# Patient Record
Sex: FEMALE | Race: WHITE | Employment: OTHER | URBAN - METROPOLITAN AREA
[De-identification: names, ages, dates, MRNs, and addresses within clinical notes are randomized per-mention and may not be internally consistent; named-entity substitution may affect disease eponyms.]

---

## 2017-02-08 ENCOUNTER — OFFICE VISIT (OUTPATIENT)
Dept: OBGYN CLINIC | Facility: CLINIC | Age: 42
End: 2017-02-08

## 2017-02-08 VITALS
HEIGHT: 67 IN | WEIGHT: 127 LBS | DIASTOLIC BLOOD PRESSURE: 67 MMHG | BODY MASS INDEX: 19.93 KG/M2 | HEART RATE: 49 BPM | SYSTOLIC BLOOD PRESSURE: 104 MMHG

## 2017-02-08 DIAGNOSIS — Z12.4 SCREENING FOR MALIGNANT NEOPLASM OF CERVIX: ICD-10-CM

## 2017-02-08 DIAGNOSIS — Z12.31 ENCOUNTER FOR SCREENING MAMMOGRAM FOR BREAST CANCER: ICD-10-CM

## 2017-02-08 DIAGNOSIS — Z01.419 ENCOUNTER FOR GYNECOLOGICAL EXAMINATION: Primary | ICD-10-CM

## 2017-02-08 PROCEDURE — 99396 PREV VISIT EST AGE 40-64: CPT | Performed by: OBSTETRICS & GYNECOLOGY

## 2017-02-09 NOTE — PROGRESS NOTES
Sam Sellers is a 38year old female [id number] Patient's last menstrual period was 01/27/2017. here for annual exam.       Last seen 12/25/15. Last pap 5/2014 normal with neg HPV. Had Paragard IUD placed in 2/2010. Menses q month x 7 days.   No ruiz EXAM:   /67 mmHg  Pulse 49  Ht 5' 7\" (1.702 m)  Wt 127 lb (57.607 kg)  BMI 19.89 kg/m2  LMP 01/27/2017  Breastfeeding?  Unknown  Wt Readings from Last 2 Encounters:  02/08/17 : 127 lb (57.607 kg)  12/28/15 : 129 lb (58.514 kg)    Body mass index is 1

## 2017-02-10 LAB — HPV I/H RISK 1 DNA SPEC QL NAA+PROBE: NEGATIVE

## 2017-03-07 ENCOUNTER — HOSPITAL ENCOUNTER (OUTPATIENT)
Dept: MAMMOGRAPHY | Facility: HOSPITAL | Age: 42
Discharge: HOME | End: 2017-03-07
Attending: OBSTETRICS & GYNECOLOGY
Payer: COMMERCIAL

## 2017-03-07 DIAGNOSIS — Z12.31 ENCOUNTER FOR SCREENING MAMMOGRAM FOR BREAST CANCER: ICD-10-CM

## 2017-03-07 PROCEDURE — 77067 SCR MAMMO BI INCL CAD: CPT

## 2017-06-15 ENCOUNTER — OFFICE VISIT (OUTPATIENT)
Dept: INTERNAL MEDICINE CLINIC | Facility: CLINIC | Age: 42
End: 2017-06-15

## 2017-06-15 VITALS
WEIGHT: 128 LBS | SYSTOLIC BLOOD PRESSURE: 95 MMHG | HEART RATE: 70 BPM | BODY MASS INDEX: 20.09 KG/M2 | OXYGEN SATURATION: 96 % | DIASTOLIC BLOOD PRESSURE: 57 MMHG | TEMPERATURE: 99 F | HEIGHT: 67 IN

## 2017-06-15 DIAGNOSIS — E04.1 THYROID NODULE: ICD-10-CM

## 2017-06-15 DIAGNOSIS — Z00.00 ADULT GENERAL MEDICAL EXAMINATION: Primary | ICD-10-CM

## 2017-06-15 PROCEDURE — 99386 PREV VISIT NEW AGE 40-64: CPT | Performed by: INTERNAL MEDICINE

## 2017-06-15 PROCEDURE — 81003 URINALYSIS AUTO W/O SCOPE: CPT | Performed by: INTERNAL MEDICINE

## 2017-06-15 NOTE — PROGRESS NOTES
HPI:    Patient ID: Nel Cain is a 38year old female. HPI  REASON FOR VISIT:    Nel Cain is a 38year old female who presents for an [address]   Nel Cain is a 38year old female who presents for a complete physical Other Topics            Concern  Caffeine Concern        Yes    Comment:coffee, chocolate, 1 cups daily          Obstetric History     T2    TAB0  SAB0  E0  M0  L2      Hx of Pap: all Paps normal              No current outpatient prescriptions Diabetes Screening  if history of high blood pressure or other  risk factors No results found for: A1C  No results found for: GLUCOSE, GLU      Gonorrhea Screening if high risk No results found for: GONOCOCCUS    HIV Screening For all adults age 22-65, old Constitutional: Negative. Negative for fever, chills, diaphoresis, activity change, appetite change, fatigue and unexpected weight change.    HENT: Negative for congestion, dental problem, drooling, ear discharge, ear pain, facial swelling, hearing loss, m BP 95/57 mmHg  Pulse 70  Temp(Src) 98.7 °F (37.1 °C) (Tympanic)  Ht 5' 7\" (1.702 m)  Wt 128 lb (58.06 kg)  BMI 20.04 kg/m2  SpO2 96%  LMP 05/20/2017 (Within Days)  Breastfeeding? No   Patient's last menstrual period was 05/20/2017 (within days).    VELIA Mouth/Throat: Uvula is midline, oropharynx is clear and moist and mucous membranes are normal. Mucous membranes are not pale, not dry and not cyanotic. She does not have dentures. No oral lesions. No trismus in the jaw.  No dental abscesses, uvula swelling Pulmonary/Chest: Effort normal and breath sounds normal. No accessory muscle usage or stridor. No apnea, no tachypnea and no bradypnea. No respiratory distress. She has no decreased breath sounds. She has no wheezes. She has no rhonchi. She has no rales.  Aníbal Lino Plantar flexion: normal   Inversion: normal   Eversion: normal     Tests   Anterior drawer: negative  Varus tilt: negative      Other   Erythema: absent  Pulse: present       Left Ankle Exam   Swelling: none    Tenderness   The patient is experiencing no t Knee pain and stiffness. Excercises. Stretch pre and post. Hydrate. Comfortable and support shoes. Hold imaging.      Orders Placed This Encounter  Lipid Panel [E]  Comp Metabolic Panel (14) [E]  TSH W Reflex To Free T4 [E]  POC Urinalysis, Auto, w/o scope

## 2017-06-15 NOTE — PATIENT INSTRUCTIONS
ASSESSMENT/PLAN:   Adult general medical examination  (primary encounter diagnosis) Check urine and blood. Thyroid nodule l side. Check U/S and blood. Knee pain and stiffness. Excercises. Stretch pre and post. Hydrate.  Comfortable and support shoes

## 2017-06-22 ENCOUNTER — NURSE ONLY (OUTPATIENT)
Dept: INTERNAL MEDICINE CLINIC | Facility: CLINIC | Age: 42
End: 2017-06-22

## 2017-06-22 DIAGNOSIS — Z00.00 ADULT GENERAL MEDICAL EXAMINATION: ICD-10-CM

## 2017-06-22 DIAGNOSIS — E04.1 THYROID NODULE: ICD-10-CM

## 2017-06-22 PROCEDURE — 36415 COLL VENOUS BLD VENIPUNCTURE: CPT | Performed by: INTERNAL MEDICINE

## 2017-06-22 PROCEDURE — 99211 OFF/OP EST MAY X REQ PHY/QHP: CPT | Performed by: INTERNAL MEDICINE

## 2017-07-03 NOTE — PROGRESS NOTES
The potassium is a little bit elevated. May be from sitting outside for too long would recommend checking a basic metabolic at lab where they can process it right away. Lots of hydration. Orders written for basic metabolic.   CMP Normal (electrolytes, toth

## 2017-07-05 ENCOUNTER — LAB ENCOUNTER (OUTPATIENT)
Dept: LAB | Facility: HOSPITAL | Age: 42
End: 2017-07-05
Attending: INTERNAL MEDICINE
Payer: COMMERCIAL

## 2017-07-05 DIAGNOSIS — E87.5 HYPERKALEMIA: ICD-10-CM

## 2017-07-05 LAB
ANION GAP SERPL CALC-SCNC: 8 MMOL/L (ref 0–18)
BUN SERPL-MCNC: 15 MG/DL (ref 8–20)
BUN/CREAT SERPL: 15.2 (ref 10–20)
CALCIUM SERPL-MCNC: 9.4 MG/DL (ref 8.5–10.5)
CHLORIDE SERPL-SCNC: 101 MMOL/L (ref 95–110)
CO2 SERPL-SCNC: 25 MMOL/L (ref 22–32)
CREAT SERPL-MCNC: 0.99 MG/DL (ref 0.5–1.5)
GLUCOSE SERPL-MCNC: 84 MG/DL (ref 70–99)
OSMOLALITY UR CALC.SUM OF ELEC: 278 MOSM/KG (ref 275–295)
POTASSIUM SERPL-SCNC: 4.6 MMOL/L (ref 3.3–5.1)
SODIUM SERPL-SCNC: 134 MMOL/L (ref 136–144)

## 2017-07-05 PROCEDURE — 36415 COLL VENOUS BLD VENIPUNCTURE: CPT

## 2017-07-05 PROCEDURE — 80048 BASIC METABOLIC PNL TOTAL CA: CPT

## 2018-01-23 ENCOUNTER — OFFICE VISIT (OUTPATIENT)
Dept: OBGYN CLINIC | Facility: CLINIC | Age: 43
End: 2018-01-23

## 2018-01-23 VITALS
DIASTOLIC BLOOD PRESSURE: 62 MMHG | SYSTOLIC BLOOD PRESSURE: 108 MMHG | HEART RATE: 52 BPM | BODY MASS INDEX: 21 KG/M2 | WEIGHT: 131.63 LBS

## 2018-01-23 DIAGNOSIS — Z12.31 ENCOUNTER FOR SCREENING MAMMOGRAM FOR BREAST CANCER: ICD-10-CM

## 2018-01-23 DIAGNOSIS — Z01.419 ENCOUNTER FOR GYNECOLOGICAL EXAMINATION: Primary | ICD-10-CM

## 2018-01-23 PROCEDURE — 99396 PREV VISIT EST AGE 40-64: CPT | Performed by: OBSTETRICS & GYNECOLOGY

## 2018-01-23 NOTE — PROGRESS NOTES
Jalen Chavarria is a 41year old female [id number] Patient's last menstrual period was 01/15/2018. here for annual exam.       Last seen 2/8/17. Last pap 2/2017 normal with neg HPV. Menses q month. Had Paragard IUD placed 2/2010.     No changes with heal denies depression or anxiety. Endocrine:   denies excessive thirst or urination. Heme/Lymph:  easy bruising or bleeding.     PHYSICAL EXAM:   /62   Pulse 52   Wt 131 lb 9.6 oz (59.7 kg)   LMP 01/15/2018   BMI 20.61 kg/m²   Wt Readings from Last 2 En

## 2018-11-05 ENCOUNTER — HOSPITAL ENCOUNTER (OUTPATIENT)
Dept: MAMMOGRAPHY | Age: 43
Discharge: HOME | End: 2018-11-05
Attending: OBSTETRICS & GYNECOLOGY
Payer: COMMERCIAL

## 2018-11-05 DIAGNOSIS — Z12.31 ENCOUNTER FOR SCREENING MAMMOGRAM FOR BREAST CANCER: ICD-10-CM

## 2018-11-05 PROCEDURE — 77063 BREAST TOMOSYNTHESIS BI: CPT | Performed by: OBSTETRICS & GYNECOLOGY

## 2018-11-05 PROCEDURE — 77067 SCR MAMMO BI INCL CAD: CPT | Performed by: OBSTETRICS & GYNECOLOGY

## 2018-11-30 ENCOUNTER — APPOINTMENT (OUTPATIENT)
Dept: GENERAL RADIOLOGY | Age: 43
End: 2018-11-30
Attending: EMERGENCY MEDICINE
Payer: COMMERCIAL

## 2018-11-30 ENCOUNTER — HOSPITAL ENCOUNTER (OUTPATIENT)
Age: 43
Discharge: HOME | End: 2018-11-30
Attending: EMERGENCY MEDICINE
Payer: COMMERCIAL

## 2018-11-30 VITALS
SYSTOLIC BLOOD PRESSURE: 115 MMHG | OXYGEN SATURATION: 99 % | HEART RATE: 66 BPM | TEMPERATURE: 99 F | DIASTOLIC BLOOD PRESSURE: 81 MMHG | RESPIRATION RATE: 18 BRPM

## 2018-11-30 DIAGNOSIS — S82.65XA CLOSED NONDISPLACED FRACTURE OF LATERAL MALLEOLUS OF LEFT FIBULA, INITIAL ENCOUNTER: Primary | ICD-10-CM

## 2018-11-30 PROCEDURE — 99214 OFFICE O/P EST MOD 30 MIN: CPT

## 2018-11-30 PROCEDURE — 99203 OFFICE O/P NEW LOW 30 MIN: CPT

## 2018-11-30 PROCEDURE — 73610 X-RAY EXAM OF ANKLE: CPT | Performed by: EMERGENCY MEDICINE

## 2018-11-30 PROCEDURE — 73630 X-RAY EXAM OF FOOT: CPT | Performed by: EMERGENCY MEDICINE

## 2018-11-30 NOTE — ED PROVIDER NOTES
Patient Seen in: [facility]    History   Patient presents with:  Lower Extremity Injury (musculoskeletal)    Stated Complaint: ankle injury    HPI    The patient is a 73-year-old female with no significant past medical h the upper and lower extremities bilaterally  Extremities: Tenderness to the lateral aspect of the left ankle and minimal tenderness to the lateral aspect of the left foot.   Skin: No ecchymosis to the left foot      ED Course   Labs Reviewed - No data to Lena Sanderson

## 2018-11-30 NOTE — PROGRESS NOTES
I have not seen this patient in greater than a year. Can see anyone. Can follow up with us. Should follow up.

## 2019-02-27 ENCOUNTER — OFFICE VISIT (OUTPATIENT)
Dept: OBGYN CLINIC | Facility: CLINIC | Age: 44
End: 2019-02-27
Payer: COMMERCIAL

## 2019-02-27 VITALS
HEIGHT: 67 IN | HEART RATE: 73 BPM | DIASTOLIC BLOOD PRESSURE: 63 MMHG | SYSTOLIC BLOOD PRESSURE: 98 MMHG | BODY MASS INDEX: 20.56 KG/M2 | WEIGHT: 131 LBS

## 2019-02-27 DIAGNOSIS — Z12.31 ENCOUNTER FOR SCREENING MAMMOGRAM FOR BREAST CANCER: ICD-10-CM

## 2019-02-27 DIAGNOSIS — Z01.419 ENCOUNTER FOR GYNECOLOGICAL EXAMINATION: Primary | ICD-10-CM

## 2019-02-27 PROCEDURE — 99396 PREV VISIT EST AGE 40-64: CPT | Performed by: OBSTETRICS & GYNECOLOGY

## 2019-02-27 NOTE — PROGRESS NOTES
Rajesh Sherman is a 45year old female [id number] Patient's last menstrual period was 02/01/2019. here for annual exam.       Last seen 1/23/18. Last pap 2/2017 normal with neg HPV. Menses q month. Had paragard IUD placed in 2/2010.     Had ankle fract headaches, extremity weakness or numbness. Psychiatric: denies depression or anxiety. Endocrine:   denies excessive thirst or urination. Heme/Lymph:  easy bruising or bleeding.     PHYSICAL EXAM:   BP 98/63   Pulse 73   Ht 5' 7\" (1.702 m)   Wt 131 lb (5

## 2019-10-15 ENCOUNTER — TELEPHONE (OUTPATIENT)
Dept: OBGYN CLINIC | Facility: CLINIC | Age: 44
End: 2019-10-15

## 2019-10-15 NOTE — TELEPHONE ENCOUNTER
Pt has annual scheduled with RHIANNA in Feb 2020 and asking if she can also have IUD removed during same visit. Pt does not want to replace IUD. Informed pt message will be routed to [facility] to ask and we will call her back.

## 2019-10-15 NOTE — TELEPHONE ENCOUNTER
Pt requesting to have IUD removed but requesting to have it be done the same day of annual appt please advise

## 2019-10-22 NOTE — TELEPHONE ENCOUNTER
Pt requesting to have 2 separate appts due to insurance. Assisted pt with scheduling appt with RHIANNA on 2/4/2020 for IUD removal. Pt very appreciative and verbalized understanding.

## 2019-11-05 ENCOUNTER — HOSPITAL ENCOUNTER (OUTPATIENT)
Dept: MAMMOGRAPHY | Age: 44
Discharge: HOME | End: 2019-11-05
Attending: OBSTETRICS & GYNECOLOGY
Payer: COMMERCIAL

## 2019-11-05 DIAGNOSIS — Z12.31 ENCOUNTER FOR SCREENING MAMMOGRAM FOR BREAST CANCER: ICD-10-CM

## 2019-11-05 PROCEDURE — 77063 BREAST TOMOSYNTHESIS BI: CPT | Performed by: OBSTETRICS & GYNECOLOGY

## 2019-11-05 PROCEDURE — 77067 SCR MAMMO BI INCL CAD: CPT | Performed by: OBSTETRICS & GYNECOLOGY

## 2019-11-07 NOTE — PROGRESS NOTES
Normal mammogram.   Breast tissue very dense. Radiology is offering whole breast ultrasound to detect any masses that might be obscured by dense breast tissue. Whole breast ultrasound may not be covered by insurance. If you want it, call office.   Primus Miguel

## 2020-02-06 ENCOUNTER — OFFICE VISIT (OUTPATIENT)
Dept: OBGYN CLINIC | Facility: CLINIC | Age: 45
End: 2020-02-06
Payer: COMMERCIAL

## 2020-02-06 VITALS
SYSTOLIC BLOOD PRESSURE: 108 MMHG | DIASTOLIC BLOOD PRESSURE: 68 MMHG | BODY MASS INDEX: 21 KG/M2 | HEART RATE: 63 BPM | WEIGHT: 131 LBS

## 2020-02-06 DIAGNOSIS — Z30.432 ENCOUNTER FOR REMOVAL OF INTRAUTERINE CONTRACEPTIVE DEVICE: Primary | ICD-10-CM

## 2020-02-06 PROCEDURE — 58301 REMOVE INTRAUTERINE DEVICE: CPT | Performed by: OBSTETRICS & GYNECOLOGY

## 2020-02-06 NOTE — PROCEDURES
IUD Removal     Consent signed. Procedure discussed with the patient in detail including indication, risks, benefits, alternatives and complications.     Pelvic Exam Findings:  Lesion description:  IUD strings seen from cervix    Procedure:  Speculum cristian

## 2020-07-30 ENCOUNTER — OFFICE VISIT (OUTPATIENT)
Dept: INTERNAL MEDICINE CLINIC | Facility: CLINIC | Age: 45
End: 2020-07-30
Payer: COMMERCIAL

## 2020-07-30 VITALS
RESPIRATION RATE: 18 BRPM | OXYGEN SATURATION: 99 % | HEART RATE: 78 BPM | SYSTOLIC BLOOD PRESSURE: 100 MMHG | HEIGHT: 67 IN | BODY MASS INDEX: 20.78 KG/M2 | DIASTOLIC BLOOD PRESSURE: 68 MMHG | WEIGHT: 132.38 LBS | TEMPERATURE: 98 F

## 2020-07-30 DIAGNOSIS — G89.29 CHRONIC PAIN OF LEFT ANKLE: ICD-10-CM

## 2020-07-30 DIAGNOSIS — Z12.4 PAP SMEAR FOR CERVICAL CANCER SCREENING: ICD-10-CM

## 2020-07-30 DIAGNOSIS — R21 RASH: ICD-10-CM

## 2020-07-30 DIAGNOSIS — Z71.85 VACCINE COUNSELING: ICD-10-CM

## 2020-07-30 DIAGNOSIS — Z00.00 ROUTINE GENERAL MEDICAL EXAMINATION AT A HEALTH CARE FACILITY: ICD-10-CM

## 2020-07-30 DIAGNOSIS — E04.1 NONTOXIC UNINODULAR GOITER: ICD-10-CM

## 2020-07-30 DIAGNOSIS — M25.572 CHRONIC PAIN OF LEFT ANKLE: ICD-10-CM

## 2020-07-30 DIAGNOSIS — E78.2 MIXED HYPERLIPIDEMIA: ICD-10-CM

## 2020-07-30 DIAGNOSIS — J45.20 MILD INTERMITTENT ASTHMA WITHOUT COMPLICATION: Primary | ICD-10-CM

## 2020-07-30 LAB
APPEARANCE: CLEAR
MULTISTIX LOT#: 1044 NUMERIC
PH, URINE: 7.5 (ref 4.5–8)
SPECIFIC GRAVITY: 1.01 (ref 1–1.03)
URINE-COLOR: YELLOW
UROBILINOGEN,SEMI-QN: 0.2 MG/DL (ref 0–1.9)

## 2020-07-30 PROCEDURE — 90732 PPSV23 VACC 2 YRS+ SUBQ/IM: CPT | Performed by: INTERNAL MEDICINE

## 2020-07-30 PROCEDURE — 3078F DIAST BP <80 MM HG: CPT | Performed by: INTERNAL MEDICINE

## 2020-07-30 PROCEDURE — 99213 OFFICE O/P EST LOW 20 MIN: CPT | Performed by: INTERNAL MEDICINE

## 2020-07-30 PROCEDURE — 81003 URINALYSIS AUTO W/O SCOPE: CPT | Performed by: INTERNAL MEDICINE

## 2020-07-30 PROCEDURE — 99386 PREV VISIT NEW AGE 40-64: CPT | Performed by: INTERNAL MEDICINE

## 2020-07-30 PROCEDURE — 90471 IMMUNIZATION ADMIN: CPT | Performed by: INTERNAL MEDICINE

## 2020-07-30 PROCEDURE — 3074F SYST BP LT 130 MM HG: CPT | Performed by: INTERNAL MEDICINE

## 2020-07-30 PROCEDURE — 3008F BODY MASS INDEX DOCD: CPT | Performed by: INTERNAL MEDICINE

## 2020-07-30 RX ORDER — CLOTRIMAZOLE AND BETAMETHASONE DIPROPIONATE 10; .64 MG/G; MG/G
1 CREAM TOPICAL 2 TIMES DAILY
Qty: 45 G | Refills: 1 | Status: SHIPPED | OUTPATIENT
Start: 2020-07-30 | End: 2021-01-20

## 2020-07-30 RX ORDER — NYSTATIN 100000 [USP'U]/G
1 POWDER TOPICAL 2 TIMES DAILY
Qty: 1 BOTTLE | Refills: 0 | Status: SHIPPED | OUTPATIENT
Start: 2020-07-30 | End: 2021-01-20

## 2020-07-30 NOTE — PROGRESS NOTES
HPI:    Patient ID: Harold Pallas is a 39year old female.     Harold Pallas is a 39year old female who presents for a complete physical exam.   HPI:   Patient presents with:  Physical: annual exam       Patient's last menstrual period was 07/15/2020 • clotrimazole-betamethasone 1-0.05 % External Cream Apply 1 Application topically 2 (two) times daily.  45 g 1      Past Medical History:   Diagnosis Date   • Ankle fracture    • Pregnancy , 2008-, FT-F--5#13; 2412-SO-T--#8   • CREATSERUM 0.99 07/05/2017 09:27 AM    CA 9.4 07/05/2017 09:27 AM    AST 21 06/22/2017 10:11 AM    ALT 12 (L) 06/22/2017 10:11 AM    TSH 1.00 06/22/2017 10:11 AM    T4F 0.87 08/24/2012 01:00 PM        Lab Results   Component Value Date/Time    CHOLEST 203 HENT: Negative for congestion, dental problem, drooling, ear discharge, ear pain, facial swelling, hearing loss, mouth sores, nosebleeds, postnasal drip, rhinorrhea, sinus pressure, sinus pain, sneezing, sore throat, tinnitus, trouble swallowing and voice • clotrimazole-betamethasone 1-0.05 % External Cream Apply 1 Application topically 2 (two) times daily.  45 g 1     Allergies:No Known Allergies    HISTORY:  Past Medical History:   Diagnosis Date   • Ankle fracture 2018   • Pregnancy 2006, 2008 2006-NSV Right Ear: Tympanic membrane, external ear and ear canal normal. No lacerations. No drainage, swelling or tenderness. No foreign bodies. No mastoid tenderness.  Tympanic membrane is not injected, not scarred, not perforated, not erythematous, not retracted Cardiovascular: Normal rate, regular rhythm, S1 normal, S2 normal, normal heart sounds, intact distal pulses and normal pulses. Pulses:       Carotid pulses are 2+ on the right side and 2+ on the left side.        Radial pulses are 2+ on the right side an Head (left side): No submental, no submandibular, no preauricular, no posterior auricular and no occipital adenopathy present. She has no cervical adenopathy.         Right cervical: No superficial cervical, no deep cervical and no posterior cervic Sig: Apply 1 Application topically 2 (two) times daily. • clotrimazole-betamethasone 1-0.05 % External Cream 45 g 1     Sig: Apply 1 Application topically 2 (two) times daily. Imaging & Referrals:  PNEUMOCOCCAL IMM (PNEUMOVAX)     RTC 1 yr.  For p

## 2020-07-30 NOTE — PATIENT INSTRUCTIONS
ASSESSMENT/PLAN:   Mild intermittent asthma without complication  (primary encounter diagnosis) Stable. Mixed hyperlipidemia Check blood. Nontoxic uninodular goiter Hold U/S. Check blood.      Routine general medical examination at a health care fac lie down. Medicine  Your healthcare provider may suggest oral nonsteroidal anti-inflammatory medicine (NSAIDs), such as ibuprofen. This relieves the pain and helps reduce swelling. Be sure to take your medicine as directed.   Exercises    After about 2 to your foot back to a straight position. Repeat this exercise 10 times. 9. Do this exercise 3 times a day, or as instructed. Cedrick last reviewed this educational content on 5/1/2016  © 7994-8964 [employer].  [address] [facility]

## 2020-09-03 ENCOUNTER — LAB ENCOUNTER (OUTPATIENT)
Dept: LAB | Facility: HOSPITAL | Age: 45
End: 2020-09-03
Attending: INTERNAL MEDICINE
Payer: COMMERCIAL

## 2020-09-03 DIAGNOSIS — Z00.00 ROUTINE GENERAL MEDICAL EXAMINATION AT A HEALTH CARE FACILITY: ICD-10-CM

## 2020-09-03 DIAGNOSIS — E78.2 MIXED HYPERLIPIDEMIA: ICD-10-CM

## 2020-09-03 DIAGNOSIS — E04.1 NONTOXIC UNINODULAR GOITER: ICD-10-CM

## 2020-09-03 LAB
ALBUMIN SERPL-MCNC: 3.9 G/DL (ref 3.4–5)
ALBUMIN/GLOB SERPL: 1.1 {RATIO} (ref 1–2)
ALP LIVER SERPL-CCNC: 51 U/L (ref 37–98)
ALT SERPL-CCNC: 16 U/L (ref 13–56)
ANION GAP SERPL CALC-SCNC: 5 MMOL/L (ref 0–18)
AST SERPL-CCNC: 14 U/L (ref 15–37)
BASOPHILS # BLD AUTO: 0.02 X10(3) UL (ref 0–0.2)
BASOPHILS NFR BLD AUTO: 0.5 %
BILIRUB SERPL-MCNC: 0.6 MG/DL (ref 0.1–2)
BUN BLD-MCNC: 15 MG/DL (ref 7–18)
BUN/CREAT SERPL: 13.9 (ref 10–20)
CALCIUM BLD-MCNC: 10 MG/DL (ref 8.5–10.1)
CHLORIDE SERPL-SCNC: 106 MMOL/L (ref 98–112)
CHOLEST SMN-MCNC: 195 MG/DL (ref ?–200)
CO2 SERPL-SCNC: 29 MMOL/L (ref 21–32)
CREAT BLD-MCNC: 1.08 MG/DL (ref 0.55–1.02)
DEPRECATED RDW RBC AUTO: 41 FL (ref 35.1–46.3)
EOSINOPHIL # BLD AUTO: 0.11 X10(3) UL (ref 0–0.7)
EOSINOPHIL NFR BLD AUTO: 2.6 %
ERYTHROCYTE [DISTWIDTH] IN BLOOD BY AUTOMATED COUNT: 11.8 % (ref 11–15)
GLOBULIN PLAS-MCNC: 3.6 G/DL (ref 2.8–4.4)
GLUCOSE BLD-MCNC: 83 MG/DL (ref 70–99)
HCT VFR BLD AUTO: 42.5 % (ref 35–48)
HDLC SERPL-MCNC: 86 MG/DL (ref 40–59)
HGB BLD-MCNC: 14.3 G/DL (ref 12–16)
IMM GRANULOCYTES # BLD AUTO: 0.01 X10(3) UL (ref 0–1)
IMM GRANULOCYTES NFR BLD: 0.2 %
LDLC SERPL CALC-MCNC: 95 MG/DL (ref ?–100)
LYMPHOCYTES # BLD AUTO: 1.4 X10(3) UL (ref 1–4)
LYMPHOCYTES NFR BLD AUTO: 33.2 %
M PROTEIN MFR SERPL ELPH: 7.5 G/DL (ref 6.4–8.2)
MCH RBC QN AUTO: 31.8 PG (ref 26–34)
MCHC RBC AUTO-ENTMCNC: 33.6 G/DL (ref 31–37)
MCV RBC AUTO: 94.7 FL (ref 80–100)
MONOCYTES # BLD AUTO: 0.34 X10(3) UL (ref 0.1–1)
MONOCYTES NFR BLD AUTO: 8.1 %
NEUTROPHILS # BLD AUTO: 2.34 X10 (3) UL (ref 1.5–7.7)
NEUTROPHILS # BLD AUTO: 2.34 X10(3) UL (ref 1.5–7.7)
NEUTROPHILS NFR BLD AUTO: 55.4 %
NONHDLC SERPL-MCNC: 109 MG/DL (ref ?–130)
OSMOLALITY SERPL CALC.SUM OF ELEC: 290 MOSM/KG (ref 275–295)
PATIENT FASTING Y/N/NP: YES
PATIENT FASTING Y/N/NP: YES
PLATELET # BLD AUTO: 126 10(3)UL (ref 150–450)
POTASSIUM SERPL-SCNC: 4.9 MMOL/L (ref 3.5–5.1)
RBC # BLD AUTO: 4.49 X10(6)UL (ref 3.8–5.3)
SODIUM SERPL-SCNC: 140 MMOL/L (ref 136–145)
TRIGL SERPL-MCNC: 72 MG/DL (ref 30–149)
TSI SER-ACNC: 1.16 MIU/ML (ref 0.36–3.74)
VLDLC SERPL CALC-MCNC: 14 MG/DL (ref 0–30)
WBC # BLD AUTO: 4.2 X10(3) UL (ref 4–11)

## 2020-09-03 PROCEDURE — 80053 COMPREHEN METABOLIC PANEL: CPT

## 2020-09-03 PROCEDURE — 36415 COLL VENOUS BLD VENIPUNCTURE: CPT

## 2020-09-03 PROCEDURE — 80061 LIPID PANEL: CPT

## 2020-09-03 PROCEDURE — 84443 ASSAY THYROID STIM HORMONE: CPT

## 2020-09-03 PROCEDURE — 85025 COMPLETE CBC W/AUTO DIFF WBC: CPT

## 2020-09-10 ENCOUNTER — LAB ENCOUNTER (OUTPATIENT)
Dept: LAB | Age: 45
End: 2020-09-10
Attending: INTERNAL MEDICINE
Payer: COMMERCIAL

## 2020-09-10 DIAGNOSIS — R79.89 PLATELET COUNT LESS 140,000 PER CUBIC MILLIMETER: ICD-10-CM

## 2020-09-10 DIAGNOSIS — R79.89 ELEVATED SERUM CREATININE: ICD-10-CM

## 2020-09-10 DIAGNOSIS — R79.89 ELEVATED PLATELET COUNT: ICD-10-CM

## 2020-09-10 DIAGNOSIS — R79.89 ELEVATED SERUM CREATININE: Primary | ICD-10-CM

## 2020-09-10 LAB
ANION GAP SERPL CALC-SCNC: 5 MMOL/L (ref 0–18)
BASOPHILS # BLD AUTO: 0.03 X10(3) UL (ref 0–0.2)
BASOPHILS NFR BLD AUTO: 0.7 %
BUN BLD-MCNC: 15 MG/DL (ref 7–18)
BUN/CREAT SERPL: 13.9 (ref 10–20)
CALCIUM BLD-MCNC: 9.9 MG/DL (ref 8.5–10.1)
CHLORIDE SERPL-SCNC: 105 MMOL/L (ref 98–112)
CO2 SERPL-SCNC: 30 MMOL/L (ref 21–32)
CREAT BLD-MCNC: 1.08 MG/DL (ref 0.55–1.02)
DEPRECATED RDW RBC AUTO: 41 FL (ref 35.1–46.3)
EOSINOPHIL # BLD AUTO: 0.11 X10(3) UL (ref 0–0.7)
EOSINOPHIL NFR BLD AUTO: 2.5 %
ERYTHROCYTE [DISTWIDTH] IN BLOOD BY AUTOMATED COUNT: 11.7 % (ref 11–15)
GLUCOSE BLD-MCNC: 85 MG/DL (ref 70–99)
HCT VFR BLD AUTO: 42.6 % (ref 35–48)
HGB BLD-MCNC: 14.2 G/DL (ref 12–16)
IMM GRANULOCYTES # BLD AUTO: 0 X10(3) UL (ref 0–1)
IMM GRANULOCYTES NFR BLD: 0 %
LYMPHOCYTES # BLD AUTO: 1.71 X10(3) UL (ref 1–4)
LYMPHOCYTES NFR BLD AUTO: 39.2 %
MCH RBC QN AUTO: 31.9 PG (ref 26–34)
MCHC RBC AUTO-ENTMCNC: 33.3 G/DL (ref 31–37)
MCV RBC AUTO: 95.7 FL (ref 80–100)
MONOCYTES # BLD AUTO: 0.41 X10(3) UL (ref 0.1–1)
MONOCYTES NFR BLD AUTO: 9.4 %
NEUTROPHILS # BLD AUTO: 2.1 X10 (3) UL (ref 1.5–7.7)
NEUTROPHILS # BLD AUTO: 2.1 X10(3) UL (ref 1.5–7.7)
NEUTROPHILS NFR BLD AUTO: 48.2 %
OSMOLALITY SERPL CALC.SUM OF ELEC: 290 MOSM/KG (ref 275–295)
PATIENT FASTING Y/N/NP: YES
PLATELET # BLD AUTO: 128 10(3)UL (ref 150–450)
POTASSIUM SERPL-SCNC: 4.8 MMOL/L (ref 3.5–5.1)
RBC # BLD AUTO: 4.45 X10(6)UL (ref 3.8–5.3)
SODIUM SERPL-SCNC: 140 MMOL/L (ref 136–145)
WBC # BLD AUTO: 4.4 X10(3) UL (ref 4–11)

## 2020-09-10 PROCEDURE — 36415 COLL VENOUS BLD VENIPUNCTURE: CPT

## 2020-09-10 PROCEDURE — 85025 COMPLETE CBC W/AUTO DIFF WBC: CPT

## 2020-09-10 PROCEDURE — 80048 BASIC METABOLIC PNL TOTAL CA: CPT

## 2020-10-13 ENCOUNTER — HOSPITAL ENCOUNTER (OUTPATIENT)
Dept: ULTRASOUND IMAGING | Facility: HOSPITAL | Age: 45
Discharge: HOME | End: 2020-10-13
Attending: NURSE PRACTITIONER
Payer: COMMERCIAL

## 2020-10-13 DIAGNOSIS — R79.89 ELEVATED SERUM CREATININE: ICD-10-CM

## 2020-10-13 PROCEDURE — 76775 US EXAM ABDO BACK WALL LIM: CPT | Performed by: NURSE PRACTITIONER

## 2020-10-13 PROCEDURE — 93975 VASCULAR STUDY: CPT | Performed by: NURSE PRACTITIONER

## 2020-11-30 ENCOUNTER — HOSPITAL ENCOUNTER (OUTPATIENT)
Age: 45
Discharge: HOME | End: 2020-11-30
Attending: EMERGENCY MEDICINE
Payer: COMMERCIAL

## 2020-11-30 ENCOUNTER — APPOINTMENT (OUTPATIENT)
Dept: GENERAL RADIOLOGY | Age: 45
End: 2020-11-30
Attending: EMERGENCY MEDICINE
Payer: COMMERCIAL

## 2020-11-30 VITALS
HEART RATE: 59 BPM | SYSTOLIC BLOOD PRESSURE: 106 MMHG | HEIGHT: 67 IN | BODY MASS INDEX: 27.47 KG/M2 | WEIGHT: 175 LBS | TEMPERATURE: 97 F | OXYGEN SATURATION: 100 % | RESPIRATION RATE: 16 BRPM | DIASTOLIC BLOOD PRESSURE: 79 MMHG

## 2020-11-30 DIAGNOSIS — M25.561 ARTHRALGIA OF RIGHT LOWER LEG: Primary | ICD-10-CM

## 2020-11-30 DIAGNOSIS — S93.401A MODERATE RIGHT ANKLE SPRAIN, INITIAL ENCOUNTER: ICD-10-CM

## 2020-11-30 PROCEDURE — 73630 X-RAY EXAM OF FOOT: CPT | Performed by: EMERGENCY MEDICINE

## 2020-11-30 PROCEDURE — 99213 OFFICE O/P EST LOW 20 MIN: CPT | Performed by: EMERGENCY MEDICINE

## 2020-11-30 PROCEDURE — 73610 X-RAY EXAM OF ANKLE: CPT | Performed by: EMERGENCY MEDICINE

## 2020-11-30 NOTE — ED INITIAL ASSESSMENT (HPI)
Pt states rolled R ankle s/p fall 2 days ago, worse after going for a walk on a rough surface on the same day.

## 2020-11-30 NOTE — ED PROVIDER NOTES
Patient Seen in: [facility] [place]    History   Patient presents with:   Ankle Pain    Stated Complaint: rt ankle inj    HPI    HPI: Mike Robles is a 39year old female who presents after an injury to the right ankle and foot with a twisting, in HPI.    Physical Exam     ED Triage Vitals [11/30/20 1123]   /79   Pulse 59   Resp 16   Temp 97.4 °F (36.3 °C)   Temp src Temporal   SpO2 100 %   O2 Device None (Room air)       Current:/79   Pulse 59   Temp 97.4 °F (36.3 °C) (Temporal)   Resp (primary encounter diagnosis)  Moderate right ankle sprain, initial encounter    Disposition:  Discharge    Follow-up:  Catalino Holley [facility]  [address] [facility] [phone]    Schedule an appointment as soon as possible for a

## 2021-01-20 ENCOUNTER — NURSE TRIAGE (OUTPATIENT)
Dept: INTERNAL MEDICINE CLINIC | Facility: CLINIC | Age: 46
End: 2021-01-20

## 2021-01-20 ENCOUNTER — LAB ENCOUNTER (OUTPATIENT)
Dept: LAB | Facility: HOSPITAL | Age: 46
End: 2021-01-20
Attending: INTERNAL MEDICINE
Payer: COMMERCIAL

## 2021-01-20 ENCOUNTER — VIRTUAL PHONE E/M (OUTPATIENT)
Dept: INTERNAL MEDICINE CLINIC | Facility: CLINIC | Age: 46
End: 2021-01-20
Payer: COMMERCIAL

## 2021-01-20 DIAGNOSIS — Z20.822 SUSPECTED COVID-19 VIRUS INFECTION: ICD-10-CM

## 2021-01-20 DIAGNOSIS — Z20.822 SUSPECTED COVID-19 VIRUS INFECTION: Primary | ICD-10-CM

## 2021-01-20 PROCEDURE — 99213 OFFICE O/P EST LOW 20 MIN: CPT | Performed by: INTERNAL MEDICINE

## 2021-01-20 NOTE — TELEPHONE ENCOUNTER
Action Requested: Summary for Provider     []  Critical Lab, Recommendations Needed  [] Need Additional Advice  [x]   FYI    []   Need Orders  [] Need Medications Sent to Pharmacy  []  Other     SUMMARY: Patient calling with complaint of headache on right

## 2021-01-20 NOTE — PROGRESS NOTES
Virtual Telephone Check-In    Matthew Zhang verbally consents to a Virtual/Telephone Check-In visit on 01/20/21. Patient has been referred to the [facility] website at [email or web address] to review the yearly Consent to Treat document.     Patient unders covid 19 -advised her to continue to monitor her symptoms, drink fluids, can take Tylenol or ibuprofen as needed for headache, fever chills or body aches, will order COVID-19 test.  If the headache gets worse if it is associate with any dizziness, blurry v

## 2021-01-21 LAB — SARS-COV-2 RNA RESP QL NAA+PROBE: NOT DETECTED

## 2021-03-18 ENCOUNTER — IMMUNIZATION (OUTPATIENT)
Dept: LAB | Facility: HOSPITAL | Age: 46
End: 2021-03-18
Attending: HOSPITALIST
Payer: COMMERCIAL

## 2021-03-18 DIAGNOSIS — Z23 NEED FOR VACCINATION: Primary | ICD-10-CM

## 2021-03-18 PROCEDURE — 0011A SARSCOV2 VAC 100MCG/0.5ML IM: CPT

## 2021-04-15 ENCOUNTER — IMMUNIZATION (OUTPATIENT)
Dept: LAB | Facility: HOSPITAL | Age: 46
End: 2021-04-15
Attending: EMERGENCY MEDICINE
Payer: COMMERCIAL

## 2021-04-15 DIAGNOSIS — Z23 NEED FOR VACCINATION: Primary | ICD-10-CM

## 2021-04-15 PROCEDURE — 0012A SARSCOV2 VAC 100MCG/0.5ML IM: CPT

## 2021-04-20 ENCOUNTER — OFFICE VISIT (OUTPATIENT)
Dept: OBGYN CLINIC | Facility: CLINIC | Age: 46
End: 2021-04-20
Payer: COMMERCIAL

## 2021-04-20 VITALS
BODY MASS INDEX: 20.75 KG/M2 | HEART RATE: 60 BPM | WEIGHT: 132.19 LBS | DIASTOLIC BLOOD PRESSURE: 66 MMHG | SYSTOLIC BLOOD PRESSURE: 100 MMHG | HEIGHT: 67 IN

## 2021-04-20 DIAGNOSIS — Z12.31 ENCOUNTER FOR SCREENING MAMMOGRAM FOR BREAST CANCER: ICD-10-CM

## 2021-04-20 DIAGNOSIS — Z01.419 ENCOUNTER FOR GYNECOLOGICAL EXAMINATION: Primary | ICD-10-CM

## 2021-04-20 DIAGNOSIS — Z12.4 SCREENING FOR MALIGNANT NEOPLASM OF CERVIX: ICD-10-CM

## 2021-04-20 PROBLEM — Z71.85 VACCINE COUNSELING: Status: RESOLVED | Noted: 2020-07-30 | Resolved: 2021-04-20

## 2021-04-20 PROCEDURE — 3074F SYST BP LT 130 MM HG: CPT | Performed by: OBSTETRICS & GYNECOLOGY

## 2021-04-20 PROCEDURE — 3078F DIAST BP <80 MM HG: CPT | Performed by: OBSTETRICS & GYNECOLOGY

## 2021-04-20 PROCEDURE — 3008F BODY MASS INDEX DOCD: CPT | Performed by: OBSTETRICS & GYNECOLOGY

## 2021-04-20 PROCEDURE — 99396 PREV VISIT EST AGE 40-64: CPT | Performed by: OBSTETRICS & GYNECOLOGY

## 2021-04-20 NOTE — PROGRESS NOTES
Jalen Chavarria is a 42year old female [id number] Patient's last menstrual period was 04/05/2021. here for annual exam.       Last seen 2/6/2020. Had Paragard IUD removed 2/2020. Menses lighter. Menses q month. LMP 4/5/21.  got vasectomy.     L headaches, extremity weakness or numbness. Psychiatric: denies depression or anxiety. Endocrine:   denies excessive thirst or urination. Heme/Lymph:  easy bruising or bleeding.     PHYSICAL EXAM:   /66   Pulse 60   Ht 5' 7\" (1.702 m)   Wt 132 lb 3 clothing

## 2021-04-28 ENCOUNTER — HOSPITAL ENCOUNTER (OUTPATIENT)
Dept: MAMMOGRAPHY | Age: 46
Discharge: HOME | End: 2021-04-28
Attending: OBSTETRICS & GYNECOLOGY
Payer: COMMERCIAL

## 2021-04-28 DIAGNOSIS — Z12.31 ENCOUNTER FOR SCREENING MAMMOGRAM FOR BREAST CANCER: ICD-10-CM

## 2021-04-28 PROCEDURE — 77067 SCR MAMMO BI INCL CAD: CPT | Performed by: OBSTETRICS & GYNECOLOGY

## 2021-04-28 PROCEDURE — 77063 BREAST TOMOSYNTHESIS BI: CPT | Performed by: OBSTETRICS & GYNECOLOGY

## 2021-12-14 ENCOUNTER — IMMUNIZATION (OUTPATIENT)
Dept: LAB | Facility: HOSPITAL | Age: 46
End: 2021-12-14
Attending: EMERGENCY MEDICINE
Payer: COMMERCIAL

## 2021-12-14 DIAGNOSIS — Z23 NEED FOR VACCINATION: Primary | ICD-10-CM

## 2021-12-14 PROCEDURE — 0064A SARSCOV2 VAC 50MCG/0.25ML IM: CPT

## 2022-03-31 ENCOUNTER — LAB ENCOUNTER (OUTPATIENT)
Dept: LAB | Facility: HOSPITAL | Age: 47
End: 2022-03-31
Attending: OBSTETRICS & GYNECOLOGY
Payer: COMMERCIAL

## 2022-03-31 ENCOUNTER — TELEPHONE (OUTPATIENT)
Dept: OBGYN CLINIC | Facility: CLINIC | Age: 47
End: 2022-03-31

## 2022-03-31 DIAGNOSIS — R30.9 PAINFUL URINATION: ICD-10-CM

## 2022-03-31 LAB
BILIRUB UR QL: NEGATIVE
COLOR UR: YELLOW
GLUCOSE UR-MCNC: NEGATIVE MG/DL
KETONES UR-MCNC: NEGATIVE MG/DL
NITRITE UR QL STRIP.AUTO: NEGATIVE
PH UR: 7 [PH] (ref 5–8)
PROT UR-MCNC: NEGATIVE MG/DL
SP GR UR STRIP: 1 (ref 1–1.03)
UROBILINOGEN UR STRIP-ACNC: <2
VIT C UR-MCNC: NEGATIVE MG/DL
WBC #/AREA URNS AUTO: >50 /HPF

## 2022-03-31 PROCEDURE — 87088 URINE BACTERIA CULTURE: CPT

## 2022-03-31 PROCEDURE — 87086 URINE CULTURE/COLONY COUNT: CPT

## 2022-03-31 PROCEDURE — 81001 URINALYSIS AUTO W/SCOPE: CPT

## 2022-03-31 PROCEDURE — 87186 SC STD MICRODIL/AGAR DIL: CPT

## 2022-03-31 RX ORDER — SULFAMETHOXAZOLE AND TRIMETHOPRIM 800; 160 MG/1; MG/1
TABLET ORAL
Qty: 20 TABLET | Refills: 0 | Status: SHIPPED | OUTPATIENT
Start: 2022-03-31

## 2022-03-31 NOTE — TELEPHONE ENCOUNTER
Discussed with RHIANNA and she stated to send Bactrim DS bid x 3 days. Informed pt that rx was sent to the pharmacy and that the urine culture is pending.

## 2022-03-31 NOTE — TELEPHONE ENCOUNTER
Patient noticed results are posted in [address] and would like to know the next course of action. Please call.

## 2022-03-31 NOTE — TELEPHONE ENCOUNTER
Pt reports painful urination and urgency since Friday. Pt is pushing fluids with no relief. Has not checked but does not feel like she has a fever. UA ordered. Pt to call for results. Pt agrees.

## 2022-03-31 NOTE — TELEPHONE ENCOUNTER
Patient has been feeling pain when urinating and an urgency since Friday. Her urine appears cloudy as well. Please advise.

## 2022-04-01 NOTE — TELEPHONE ENCOUNTER
Late entry from 3/31. Pharmacy called and talked to RN to confirm that the Bactrim should be six tablets to be filled. Pharmacy will fill six tablets.

## 2022-04-03 ENCOUNTER — PATIENT MESSAGE (OUTPATIENT)
Dept: OBGYN CLINIC | Facility: CLINIC | Age: 47
End: 2022-04-03

## 2022-04-04 RX ORDER — NITROFURANTOIN 25; 75 MG/1; MG/1
100 CAPSULE ORAL 2 TIMES DAILY
Qty: 14 CAPSULE | Refills: 0 | Status: SHIPPED | OUTPATIENT
Start: 2022-04-04 | End: 2022-04-11

## 2022-04-04 NOTE — TELEPHONE ENCOUNTER
From: Primo Hui  To: Devon Guaman MD  Sent: 4/3/2022 6:49 PM CDT  Subject: UTI    Hello,    I started an antibiotic last Thursday night for a UTI and have finished the pills. I felt a little better at first but now I have the same symptoms - pain, urgency and cloudy urine. I picked up some over the counter supplements at [facility] like AZO pain relief, AZO cranberry supplement, and a women's probiotic and started those this morning. Please let me know if I should continue on an antibiotic. A prescription can be sent to [facility] on [address] in [facility]. Thank you!

## 2022-04-04 NOTE — TELEPHONE ENCOUNTER
Pt was treated on 3/31 for UTI that showed E-coli on culture. Pt was given Bactrim DS x3 days. Culture indicates bacteria is Resistant to Trimethoprim/Sulfa    NKDA    To REMBERTO on-call to review in [facility] [id number] absence and advise.

## 2022-08-05 ENCOUNTER — OFFICE VISIT (OUTPATIENT)
Dept: OBGYN CLINIC | Facility: CLINIC | Age: 47
End: 2022-08-05
Payer: COMMERCIAL

## 2022-08-05 VITALS
HEART RATE: 76 BPM | SYSTOLIC BLOOD PRESSURE: 102 MMHG | BODY MASS INDEX: 21 KG/M2 | WEIGHT: 132.63 LBS | DIASTOLIC BLOOD PRESSURE: 69 MMHG

## 2022-08-05 DIAGNOSIS — Z01.419 WELL WOMAN EXAM WITH ROUTINE GYNECOLOGICAL EXAM: Primary | ICD-10-CM

## 2022-08-05 DIAGNOSIS — Z12.31 ENCOUNTER FOR SCREENING MAMMOGRAM FOR MALIGNANT NEOPLASM OF BREAST: ICD-10-CM

## 2022-08-05 DIAGNOSIS — Z12.11 ENCOUNTER FOR SCREENING COLONOSCOPY: ICD-10-CM

## 2022-08-05 PROCEDURE — 99396 PREV VISIT EST AGE 40-64: CPT | Performed by: NURSE PRACTITIONER

## 2022-08-05 PROCEDURE — 3078F DIAST BP <80 MM HG: CPT | Performed by: NURSE PRACTITIONER

## 2022-08-05 PROCEDURE — 3074F SYST BP LT 130 MM HG: CPT | Performed by: NURSE PRACTITIONER

## 2022-12-09 ENCOUNTER — HOSPITAL ENCOUNTER (OUTPATIENT)
Dept: MAMMOGRAPHY | Facility: HOSPITAL | Age: 47
Discharge: HOME | End: 2022-12-09
Attending: NURSE PRACTITIONER
Payer: COMMERCIAL

## 2022-12-09 DIAGNOSIS — Z12.31 ENCOUNTER FOR SCREENING MAMMOGRAM FOR MALIGNANT NEOPLASM OF BREAST: ICD-10-CM

## 2022-12-09 PROCEDURE — 77067 SCR MAMMO BI INCL CAD: CPT | Performed by: NURSE PRACTITIONER

## 2022-12-09 PROCEDURE — 77063 BREAST TOMOSYNTHESIS BI: CPT | Performed by: NURSE PRACTITIONER

## 2023-02-15 ENCOUNTER — NURSE ONLY (OUTPATIENT)
Facility: CLINIC | Age: 48
End: 2023-02-15

## 2023-02-15 RX ORDER — POLYETHYLENE GLYCOL 3350, SODIUM CHLORIDE, SODIUM BICARBONATE, POTASSIUM CHLORIDE 420; 11.2; 5.72; 1.48 G/4L; G/4L; G/4L; G/4L
POWDER, FOR SOLUTION ORAL
Qty: 4000 ML | Refills: 0 | Status: SHIPPED | OUTPATIENT
Start: 2023-02-15

## 2023-04-28 ENCOUNTER — SURGERY CENTER DOCUMENTATION (OUTPATIENT)
Dept: SURGERY | Age: 48
End: 2023-04-28

## 2023-04-28 NOTE — PROCEDURES
[address] Endoscopy Report      Date of Procedure:  04/28/23      Preoperative Diagnosis:  Colorectal cancer screening      Postoperative Diagnosis:  Colon polyps      Procedure:    Colonoscopy with polypectomy      Surgeon:  Joseph Marrero M.D. Anesthesia:  Monitored anesthesia care  Cecal withdrawal time:  14 minutes  EBL:  Insignificant      Brief History: This is a 57year old female who presents for her first screening colonoscopy. She has had no lower gastrointestinal tract symptoms, signs or first degree relatives with colorectal cancer. Her father had colon polyps. Technique:  After informed consent, the patient was placed in the left lateral recumbent position. Digital rectal examination revealed no palpable intraluminal abnormalities. An Olympus variable stiffness 190 series HD colonoscope was inserted into the rectum and advanced under direct vision by following the lumen to the terminal ileum. The colon was examined upon withdrawal in the left lateral recumbent position. Findings:  The preparation of the colon was excellent. The terminal ileum was examined for 5 cm and visually normal.  The ileocecal valve was well preserved. The visualized colonic mucosa from the cecum to the anal verge was normal with an intact vascular pattern. There were #2 polyps seen within the colon which removed as follows:    1. In the proximal transverse colon there was a 7-8 mm serrated sessile polyp which was cold snare excised and retrieved. 2.  In the rectum there was a 3 mm sessile polyp which was cold snare excised and retrieved. Inspection of both sites revealed no evidence of ongoing bleeding. There were no other colonic polyps, definite diverticula, mass lesions, vascular anomalies or signs of inflammation seen. Retroflexion in the rectum revealed no abnormalities. The procedure was well tolerated without immediate complication. Impression:  1.   Colon polyps  2. Otherwise normal colonoscopy to the terminal ileum    Recommendations:  1. Standard post polypectomy instructions given. 2.  Follow-up biopsy results. Polyp histology to determine recommendations regarding follow-up.         Joey Barraza MD  4/28/2023

## 2023-05-03 ENCOUNTER — TELEPHONE (OUTPATIENT)
Facility: CLINIC | Age: 48
End: 2023-05-03

## 2023-05-05 NOTE — TELEPHONE ENCOUNTER
Dr Anibal Capps path report from [address] [facility] on 4/28/2023    Letter sent to scanning
Health maintenance updated. 3 year colonoscopy recall placed in patient outreach. Next due on 04/28/2026 per Dr. Donna Art.
I have not seen this patient since July 30, 2020. Patient needs to reestablish care with someone. If she has a new PCP please change who her PCP is.
I spoke to the patient. She is feeling well. She had #2 polyps removed. The transverse colon polyp was a sessile serrated adenoma. This was visually 8 mm in size, however, histologically 11 mm in size. I discussed the significance. The small rectal polyp was removed but not retrieved. This was visually benign. In light of the histologic size of the transverse colon polyp I have recommended a surveillance colonoscopy in 3 years. The patient is leaning towards a Sutab preparation at that time. GI RNs: Please enter colonoscopy recall for 3 years. Ty Ellis.
[email or web address] message sent to patient.
Patient has read [email or web address] on 5/4/23. overdue for appointment. Message [phone]  From   Oleksandr Cobian RN To   [facility] and Delivered   5/4/2023 11:06 AM   Last Read in [address]   5/4/2023 11:28 AM by Ronn Pedor         No future appointments.
Triage RN, please follow up on Dr. Ovi Ross message below. Thank you.
[employer]

## 2024-10-23 PROBLEM — Z12.11 COLON CANCER SCREENING: Status: ACTIVE | Noted: 2024-10-23

## 2024-10-23 PROBLEM — Z00.00 ADULT GENERAL MEDICAL EXAMINATION: Status: ACTIVE | Noted: 2024-10-23

## 2024-10-23 PROBLEM — Z12.31 SCREENING MAMMOGRAM FOR BREAST CANCER: Status: ACTIVE | Noted: 2024-10-23

## 2024-10-24 ENCOUNTER — OFFICE VISIT (OUTPATIENT)
Dept: INTERNAL MEDICINE CLINIC | Facility: CLINIC | Age: 49
End: 2024-10-24

## 2024-10-24 VITALS
BODY MASS INDEX: 23.13 KG/M2 | TEMPERATURE: 98 F | OXYGEN SATURATION: 100 % | DIASTOLIC BLOOD PRESSURE: 69 MMHG | HEART RATE: 73 BPM | WEIGHT: 147.38 LBS | HEIGHT: 67 IN | SYSTOLIC BLOOD PRESSURE: 104 MMHG

## 2024-10-24 DIAGNOSIS — J45.20 MILD INTERMITTENT ASTHMA WITHOUT COMPLICATION (HCC): ICD-10-CM

## 2024-10-24 DIAGNOSIS — Z12.31 SCREENING MAMMOGRAM FOR BREAST CANCER: ICD-10-CM

## 2024-10-24 DIAGNOSIS — E04.1 NONTOXIC UNINODULAR GOITER: ICD-10-CM

## 2024-10-24 DIAGNOSIS — E78.2 MIXED HYPERLIPIDEMIA: Primary | ICD-10-CM

## 2024-10-24 DIAGNOSIS — R21 RASH: ICD-10-CM

## 2024-10-24 DIAGNOSIS — Z00.00 ADULT GENERAL MEDICAL EXAMINATION: ICD-10-CM

## 2024-10-24 DIAGNOSIS — Z12.11 COLON CANCER SCREENING: ICD-10-CM

## 2024-10-24 DIAGNOSIS — Z12.4 PAP SMEAR FOR CERVICAL CANCER SCREENING: ICD-10-CM

## 2024-10-24 DIAGNOSIS — L60.8 CHANGE IN NAIL APPEARANCE: ICD-10-CM

## 2024-10-24 DIAGNOSIS — R31.21 ASYMPTOMATIC MICROSCOPIC HEMATURIA: ICD-10-CM

## 2024-10-24 DIAGNOSIS — Z71.85 VACCINE COUNSELING: ICD-10-CM

## 2024-10-24 LAB
APPEARANCE: CLEAR
BILIRUB UR QL: NEGATIVE
BILIRUBIN: NEGATIVE
CLARITY UR: CLEAR
COLOR UR: COLORLESS
GLUCOSE (URINE DIPSTICK): NEGATIVE MG/DL
GLUCOSE UR-MCNC: NORMAL MG/DL
HGB UR QL STRIP.AUTO: NEGATIVE
KETONES (URINE DIPSTICK): NEGATIVE MG/DL
KETONES UR-MCNC: NEGATIVE MG/DL
LEUKOCYTE ESTERASE UR QL STRIP.AUTO: NEGATIVE
LEUKOCYTES: NEGATIVE
MULTISTIX LOT#: ABNORMAL NUMERIC
NITRITE UR QL STRIP.AUTO: NEGATIVE
NITRITE, URINE: NEGATIVE
PH UR: 6 [PH] (ref 5–8)
PH, URINE: 5.5 (ref 4.5–8)
PROT UR-MCNC: NEGATIVE MG/DL
PROTEIN (URINE DIPSTICK): NEGATIVE MG/DL
SP GR UR STRIP: <1.005 (ref 1–1.03)
SPECIFIC GRAVITY: 1 (ref 1–1.03)
URINE-COLOR: YELLOW
UROBILINOGEN UR STRIP-ACNC: NORMAL
UROBILINOGEN,SEMI-QN: 0.2 MG/DL (ref 0–1.9)

## 2024-10-24 PROCEDURE — 90715 TDAP VACCINE 7 YRS/> IM: CPT | Performed by: INTERNAL MEDICINE

## 2024-10-24 PROCEDURE — 81003 URINALYSIS AUTO W/O SCOPE: CPT | Performed by: INTERNAL MEDICINE

## 2024-10-24 PROCEDURE — 90472 IMMUNIZATION ADMIN EACH ADD: CPT | Performed by: INTERNAL MEDICINE

## 2024-10-24 PROCEDURE — 99204 OFFICE O/P NEW MOD 45 MIN: CPT | Performed by: INTERNAL MEDICINE

## 2024-10-24 PROCEDURE — 90471 IMMUNIZATION ADMIN: CPT | Performed by: INTERNAL MEDICINE

## 2024-10-24 PROCEDURE — 99386 PREV VISIT NEW AGE 40-64: CPT | Performed by: INTERNAL MEDICINE

## 2024-10-24 PROCEDURE — 90656 IIV3 VACC NO PRSV 0.5 ML IM: CPT | Performed by: INTERNAL MEDICINE

## 2024-10-24 RX ORDER — CLOTRIMAZOLE AND BETAMETHASONE DIPROPIONATE 10; .64 MG/G; MG/G
1 CREAM TOPICAL 2 TIMES DAILY
Qty: 60 G | Refills: 3 | Status: SHIPPED | OUTPATIENT
Start: 2024-10-24

## 2024-10-24 NOTE — PATIENT INSTRUCTIONS
ASSESSMENT/PLAN:     Encounter Diagnoses   Name Primary?    Mixed hyperlipidemia Check blood.    Yes    Nontoxic uninodular goiter Check US and blood.        Mild intermittent asthma without complication (HCC) ? Dx. Stable.        Vaccine counseling Tdap and flu shot. Recc. Covid booster.  Wait on COVID booster 2 weeks after Tdap and flu shot.  Recommend waiting 2 weeks between vaccines.Recommend PCV 20 vaccine.  Can schedule a nurse only visit to get the pneumonia vaccine in 2 weeks after the Tdap and flu shot.         Screening mammogram for breast cancer Check mammogram. Continue self breast exam every month.         Colon cancer screening up-to-date with screening.       Adult general medical examination Check urine.        Pap smear for cervical cancer screening FU gyne. For pap.        Change in nail appearance L bog toe. Check Cx. ?        Rash L foot. Try lotrisone 2 times a day with dry skin for 4 weeks.     Insomnia. Stick to a sleep schedule. Keep your bedtime and wake time consistent from day to day, including on weekends.   Stay active. Regular activity helps promote a good night's sleep. Schedule exercise at least a few hours before bedtime and avoid stimulating activities before bedtime.   Check your medications. If you take medications regularly, check with your doctor to see if they may be contributing to your insomnia. Also check the labels of OTC products to see if they contain caffeine or other stimulants, such as pseudoephedrine.   Avoid or limit naps. Naps can make it harder to fall asleep at night. If you can't get by without one, try to limit a nap to no more than 30 minutes and don't nap after 3 p.m.   Avoid or limit caffeine and alcohol and don't use nicotine. All of these can make it harder to sleep, and effects can last for several hours.   Avoid large meals and beverages before bed. A light snack is fine and may help avoid heartburn. Drink less liquid before bedtime so that you won't  have to urinate as often.  At bedtime:  Try melatonin 10 mg 30 minutes before bed.  Make your bedroom comfortable for sleep. Only use your bedroom for sex or sleep. Keep it dark and quiet, at a comfortable temperature. Hide all clocks in your bedroom, including your wristwatch and cellphone, so you don't worry about what time it is.   Find ways to relax. Try to put your worries and planning aside when you get into bed. A warm bath or a massage before bedtime can help prepare you for sleep. Create a relaxing bedtime ritual, such as taking a hot bath, reading, soft music, breathing exercises, yoga or prayer.   Avoid trying too hard to sleep. The harder you try, the more awake you'll become. Read in another room until you become very drowsy, then go to bed to sleep. Don't go to bed too early, before you're sleepy.   Get out of bed when you're not sleeping. Sleep as much as you need to feel rested, and then get out of bed. Don't stay in bed if you're not sleeping.       Take calcium 600 every 12 hrs. With vitamin D 400 IU every  12 hrs. Excerise at least 30 minutes 3-4 times a week. May use calcium citrate as opposed to calcium carbonate which may be better absorbed in the setting of PPI use.  The preferred calcium supplement for people at risk of stone formation is calcium citrate because it helps to increase urinary citrate excretion. We recommend a dose of 200-400 mg if dietary calcium cannot be increased.     Travel.  use insect repellent, wear long-sleeved shirts and long pants, and get vaccinated.    Use insect repellent, wear long-sleeved shirts and pants, treat clothing and gear, and get vaccinated before traveling, if vaccination is recommended for you.  Drink bottled water if possible.  Do not use ice at all.  Use SPF 50 at least and reapply at least every 2 hours.  Use vegetables that have thicker skin that can be peeled off.  I.e. oranges etc.  Try not to drink any juices or anything that has water in  it.  If Water are not bilateral to make sure he has been boiled.  Do not add ice to boiled water.     Orders Placed This Encounter   Procedures    Lipid Panel    CBC With Differential With Platelet    Comp Metabolic Panel (14)    TSH W Reflex To Free T4    URINALYSIS, AUTO, W/O SCOPE    Vitamin D    Fluzone trivalent vaccine, PF 0.5mL, 6mo+ (28899)    TETANUS, DIPHTHERIA TOXOIDS AND ACELLULAR PERTUSIS VACCINE (TDAP), >7 YEARS, IM USE    Dermatophyte Only, Culture [E]       Meds This Visit:  Requested Prescriptions     Signed Prescriptions Disp Refills    clotrimazole-betamethasone 1-0.05 % External Cream 60 g 3     Sig: Apply 1 Application topically 2 (two) times daily.       Imaging & Referrals:  INFLUENZA VACCINE, TRI, PRESERV FREE, 0.5 ML  TETANUS, DIPHTHERIA TOXOIDS AND ACELLULAR PERTUSIS VACCINE (TDAP), >7 YEARS, IM USE  [facility] KARI 2D+3D SCREENING BILAT (CPT=77067/20642)  US THYROID (CPT=76536)      RTC 1 yr, for physical.

## 2024-10-24 NOTE — PROGRESS NOTES
HPI:    Patient ID: Lo Amato is a 49 year old female.    Lo Amato is a 49 year old female who presents for a complete physical exam.   HPI:     Chief Complaint   Patient presents with    Physical     Patient states she is here for an Annual Physical Examination.         No LMP recorded.LNMP; 10-11-24. Qmonth X 5days heavy X 2 days. . Super tampon soaked. Switched to cups. Pete't with gyne. Dr. Quinones.     /69 (BP Location: Right arm, Patient Position: Sitting, Cuff Size: adult)   Pulse 73   Temp 98.1 °F (36.7 °C) (Oral)   Ht 5' 7\" (1.702 m)   Wt 147 lb 6.4 oz (66.9 kg)   SpO2 100%   BMI 23.09 kg/m²    Wt Readings from Last 4 Encounters:   10/24/24 147 lb 6.4 oz (66.9 kg)   08/05/22 132 lb 9.6 oz (60.1 kg)   04/20/21 132 lb 3.2 oz (60 kg)   11/30/20 175 lb (79.4 kg)     Body mass index is 23.09 kg/m².     Labs:   Lab Results   Component Value Date/Time    WBC 4.4 09/10/2020 07:33 AM    HGB 14.2 09/10/2020 07:33 AM    .0 (L) 09/10/2020 07:33 AM      Lab Results   Component Value Date/Time    GLU 85 09/10/2020 07:33 AM     09/10/2020 07:33 AM    K 4.8 09/10/2020 07:33 AM     09/10/2020 07:33 AM    CO2 30.0 09/10/2020 07:33 AM    CREATSERUM 1.08 (H) 09/10/2020 07:33 AM    CA 9.9 09/10/2020 07:33 AM    ALB 3.9 09/03/2020 07:50 AM    TP 7.5 09/03/2020 07:50 AM    ALKPHO 51 09/03/2020 07:50 AM    AST 14 (L) 09/03/2020 07:50 AM    ALT 16 09/03/2020 07:50 AM    BILT 0.6 09/03/2020 07:50 AM    TSH 1.160 09/03/2020 07:50 AM    T4F 0.87 08/24/2012 01:00 PM        Lab Results   Component Value Date/Time    CHOLEST 195 09/03/2020 07:50 AM    HDL 86 (H) 09/03/2020 07:50 AM    TRIG 72 09/03/2020 07:50 AM    LDL 95 09/03/2020 07:50 AM    NONHDLC 109 09/03/2020 07:50 AM       No results found for: \"A1C\"   No results found for: \"VITD\"      Health Maintenance   Topic Date Due    Mammogram  12/09/2023       Current Outpatient Medications   Medication Sig Dispense Refill     clotrimazole-betamethasone 1-0.05 % External Cream Apply 1 Application topically 2 (two) times daily. 60 g 3    PEG 3350-KCl-Na Bicarb-NaCl (TRILYTE) 420 g Oral Recon Soln Take prep as directed by gastro office. May substitute with Trilyte/generic equivalent if needed. 4000 mL 0      Past Medical History:    Ankle fracture    Pregnancy (HCC)    2006-, FT-F--5#13; 5836-GY-E--5#8    Thyroid nodule    biopsy benign      No past surgical history on file.   Family History   Problem Relation Age of Onset    Heart Disease Father         CAD    Diabetes Father     Cancer Maternal Grandfather         lung    Diabetes Mother     Heart Disease Mother         CAD    Colon Cancer Paternal Grandfather       Social History:  Social History     Socioeconomic History    Marital status:    Tobacco Use    Smoking status: Never    Smokeless tobacco: Never   Vaping Use    Vaping status: Never Used   Substance and Sexual Activity    Alcohol use: Yes     Alcohol/week: 0.0 standard drinks of alcohol     Comment: occasionally    Drug use: Never    Sexual activity: Yes     Comment: NONE   Other Topics Concern    Caffeine Concern Yes     Comment: coffee, chocolate, 1 cups daily         OB History    Para Term  AB Living   2 2 2 0 0 2   SAB IAB Ectopic Multiple Live Births   0 0 0 0 2        Hx of Pap: all Paps normal           Labs:   Lab Results   Component Value Date/Time    GLU 85 09/10/2020 07:33 AM     09/10/2020 07:33 AM    K 4.8 09/10/2020 07:33 AM     09/10/2020 07:33 AM    CO2 30.0 09/10/2020 07:33 AM    CREATSERUM 1.08 (H) 09/10/2020 07:33 AM    CA 9.9 09/10/2020 07:33 AM    AST 14 (L) 2020 07:50 AM    ALT 16 2020 07:50 AM    TSH 1.160 2020 07:50 AM    T4F 0.87 2012 01:00 PM        Lab Results   Component Value Date/Time    CHOLEST 195 2020 07:50 AM    HDL 86 (H) 2020 07:50 AM    TRIG 72 2020 07:50 AM    LDL 95 2020 07:50 AM    NONHDLC 109  09/03/2020 07:50 AM           Going to [facility] in 1[facility] for 1 week. To resort.  Will be taking bottled water.  Will not be traveling outside of the resort.    Was told had as above with bronchitis.  But has not really been diagnosed officially with asthma.    Left toenail had a fungal infection but never healed properly has noticed for more than a year.    Rash  This is a new problem. The current episode started more than 1 month ago. The problem is unchanged. The affected locations include the left foot. The rash is characterized by scaling. She was exposed to nothing. Associated symptoms include fatigue. Pertinent negatives include no congestion, cough, diarrhea, eye pain, fever, rhinorrhea, shortness of breath, sore throat or vomiting. Past treatments include nothing.         Review of Systems   Constitutional:  Positive for fatigue. Negative for activity change, appetite change, chills, diaphoresis, fever and unexpected weight change.   HENT:  Negative for congestion, dental problem, drooling, ear discharge, ear pain, facial swelling, hearing loss, mouth sores, nosebleeds, postnasal drip, rhinorrhea, sinus pressure, sinus pain, sneezing, sore throat, tinnitus, trouble swallowing and voice change.    Eyes:  Negative for photophobia, pain, discharge, redness, itching and visual disturbance.   Respiratory:  Negative for apnea, cough, choking, chest tightness, shortness of breath, wheezing and stridor.    Cardiovascular:  Negative for chest pain, palpitations and leg swelling.   Gastrointestinal:  Negative for abdominal distention, abdominal pain, anal bleeding, blood in stool, constipation, diarrhea, nausea, rectal pain and vomiting.   Endocrine: Negative for cold intolerance, heat intolerance, polydipsia, polyphagia and polyuria.   Genitourinary:  Negative for decreased urine volume, difficulty urinating, dysuria, flank pain, frequency, hematuria, menstrual problem, pelvic pain, urgency, vaginal bleeding,  vaginal discharge and vaginal pain.   Skin:  Positive for rash.   Neurological:  Negative for dizziness, tremors, seizures, syncope, facial asymmetry, speech difficulty, weakness, light-headedness, numbness and headaches.   Psychiatric/Behavioral:  Positive for sleep disturbance. Negative for agitation, behavioral problems, confusion, decreased concentration, dysphoric mood, hallucinations, self-injury and suicidal ideas. The patient is not nervous/anxious and is not hyperactive.         Working with different sleep patterns.  Does not drink a lot of caffeine at night.  Tries to turn off all the clocks.  But gets involved sometimes with reading or TV at night   All other systems reviewed and are negative.        Current Outpatient Medications   Medication Sig Dispense Refill    clotrimazole-betamethasone 1-0.05 % External Cream Apply 1 Application topically 2 (two) times daily. 60 g 3    PEG 3350-KCl-Na Bicarb-NaCl (TRILYTE) 420 g Oral Recon Soln Take prep as directed by gastro office. May substitute with Trilyte/generic equivalent if needed. 4000 mL 0     Allergies:Allergies[1]    HISTORY:  Past Medical History:    Ankle fracture    Pregnancy (HCC)    -, FT-F--5#13; 1514-CZ-G--5#8    Thyroid nodule    biopsy benign      No past surgical history on file.   Family History   Problem Relation Age of Onset    Heart Disease Father         CAD    Diabetes Father     Cancer Maternal Grandfather         lung    Diabetes Mother     Heart Disease Mother         CAD    Colon Cancer Paternal Grandfather       Social History:   Social History     Socioeconomic History    Marital status:    Tobacco Use    Smoking status: Never    Smokeless tobacco: Never   Vaping Use    Vaping status: Never Used   Substance and Sexual Activity    Alcohol use: Yes     Alcohol/week: 0.0 standard drinks of alcohol     Comment: occasionally    Drug use: Never    Sexual activity: Yes     Comment: NONE   Other Topics Concern     Caffeine Concern Yes     Comment: coffee, chocolate, 1 cups daily        Exercise level: exercises 7 times a  week (walks 1 mile with [place] [employer] dog X 1 mile and ypoga once a week) and has been following low salt diet.  Weight has been stable.  Wt Readings from Last 3 Encounters:   10/24/24 147 lb 6.4 oz (66.9 kg)   08/05/22 132 lb 9.6 oz (60.1 kg)   04/20/21 132 lb 3.2 oz (60 kg)     BP Readings from Last 3 Encounters:   10/24/24 104/69   08/05/22 102/69   04/20/21 100/66       Labs:   Lab Results   Component Value Date/Time    GLU 85 09/10/2020 07:33 AM     09/10/2020 07:33 AM    K 4.8 09/10/2020 07:33 AM     09/10/2020 07:33 AM    CO2 30.0 09/10/2020 07:33 AM    CREATSERUM 1.08 (H) 09/10/2020 07:33 AM    CA 9.9 09/10/2020 07:33 AM    AST 14 (L) 09/03/2020 07:50 AM    ALT 16 09/03/2020 07:50 AM    TSH 1.160 09/03/2020 07:50 AM    T4F 0.87 08/24/2012 01:00 PM        Lab Results   Component Value Date/Time    CHOLEST 195 09/03/2020 07:50 AM    HDL 86 (H) 09/03/2020 07:50 AM    TRIG 72 09/03/2020 07:50 AM    LDL 95 09/03/2020 07:50 AM    NONHDLC 109 09/03/2020 07:50 AM          PHYSICAL EXAM:   /69 (BP Location: Right arm, Patient Position: Sitting, Cuff Size: adult)   Pulse 73   Temp 98.1 °F (36.7 °C) (Oral)   Ht 5' 7\" (1.702 m)   Wt 147 lb 6.4 oz (66.9 kg)   SpO2 100%   BMI 23.09 kg/m²   BP Readings from Last 3 Encounters:   10/24/24 104/69   08/05/22 102/69   04/20/21 100/66     Wt Readings from Last 3 Encounters:   10/24/24 147 lb 6.4 oz (66.9 kg)   08/05/22 132 lb 9.6 oz (60.1 kg)   04/20/21 132 lb 3.2 oz (60 kg)       Physical Exam  Vitals and nursing note reviewed.   Constitutional:       General: She is not in acute distress.     Appearance: Normal appearance. She is well-developed and well-groomed. She is not ill-appearing, toxic-appearing or diaphoretic.      Interventions: She is not intubated.  HENT:      Head: Normocephalic and atraumatic.      Right Ear: Tympanic membrane,  ear canal and external ear normal. No decreased hearing noted. No laceration, drainage, swelling or tenderness. No middle ear effusion. There is no impacted cerumen. No foreign body. No mastoid tenderness. No PE tube. No hemotympanum. Tympanic membrane is not injected, scarred, perforated, erythematous, retracted or bulging. Tympanic membrane has normal mobility.      Left Ear: Tympanic membrane, ear canal and external ear normal. No decreased hearing noted. No laceration, drainage, swelling or tenderness.  No middle ear effusion. There is no impacted cerumen. No foreign body. No mastoid tenderness. No PE tube. No hemotympanum. Tympanic membrane is not injected, scarred, perforated, erythematous, retracted or bulging. Tympanic membrane has normal mobility.      Nose:      Right Sinus: No maxillary sinus tenderness or frontal sinus tenderness.      Left Sinus: No maxillary sinus tenderness or frontal sinus tenderness.      Mouth/Throat:      Lips: Pink. No lesions.      Mouth: Mucous membranes are moist. No injury, lacerations, oral lesions or angioedema.      Dentition: No dental tenderness, gingival swelling, dental abscesses or gum lesions.      Tongue: No lesions. Tongue does not deviate from midline.      Palate: No mass and lesions.      Pharynx: Oropharynx is clear. Uvula midline. No pharyngeal swelling, oropharyngeal exudate, posterior oropharyngeal erythema or uvula swelling.      Tonsils: No tonsillar exudate or tonsillar abscesses.   Eyes:      General: Lids are normal. Gaze aligned appropriately. No scleral icterus.        Right eye: No foreign body, discharge or hordeolum.         Left eye: No foreign body, discharge or hordeolum.      Extraocular Movements: Extraocular movements intact.      Right eye: Normal extraocular motion and no nystagmus.      Left eye: Normal extraocular motion and no nystagmus.      Conjunctiva/sclera: Conjunctivae normal.      Right eye: Right conjunctiva is not injected. No  chemosis, exudate or hemorrhage.     Left eye: Left conjunctiva is not injected. No chemosis, exudate or hemorrhage.     Pupils: Pupils are equal, round, and reactive to light.   Neck:      Thyroid: Thyromegaly (L >>R.) present. No thyroid mass or thyroid tenderness.      Vascular: Normal carotid pulses. No carotid bruit, hepatojugular reflux or JVD.      Trachea: Trachea and phonation normal. No tracheal tenderness, tracheostomy, abnormal tracheal secretions or tracheal deviation.   Cardiovascular:      Rate and Rhythm: Normal rate and regular rhythm.      Pulses: Normal pulses.           Carotid pulses are 2+ on the right side and 2+ on the left side.       Radial pulses are 2+ on the right side and 2+ on the left side.        Dorsalis pedis pulses are 2+ on the right side and 2+ on the left side.        Posterior tibial pulses are 2+ on the right side and 2+ on the left side.      Heart sounds: Normal heart sounds, S1 normal and S2 normal.   Pulmonary:      Effort: Pulmonary effort is normal. No tachypnea, bradypnea, accessory muscle usage, prolonged expiration, respiratory distress or retractions. She is not intubated.      Breath sounds: Normal breath sounds and air entry. No stridor, decreased air movement or transmitted upper airway sounds. No decreased breath sounds, wheezing, rhonchi or rales.   Chest:      Chest wall: No tenderness.      Comments: Breast exam deferred.  Patient will be seeing GYN.  Abdominal:      General: Bowel sounds are normal. There is no distension.      Palpations: Abdomen is soft. Abdomen is not rigid. There is no fluid wave, hepatomegaly, splenomegaly or mass.      Tenderness: There is no abdominal tenderness. There is no right CVA tenderness, left CVA tenderness, guarding or rebound.   Musculoskeletal:      Cervical back: Neck supple. No tenderness.      Right lower leg: No edema.      Left lower leg: No edema.        Feet:    Lymphadenopathy:      Head:      Right side of head:  No submental, submandibular, preauricular, posterior auricular or occipital adenopathy.      Left side of head: No submental, submandibular, preauricular, posterior auricular or occipital adenopathy.      Cervical: No cervical adenopathy.      Right cervical: No superficial, deep or posterior cervical adenopathy.     Left cervical: No superficial, deep or posterior cervical adenopathy.      Upper Body:      Right upper body: No supraclavicular adenopathy.      Left upper body: No supraclavicular adenopathy.   Skin:     General: Skin is warm and dry.      Coloration: Skin is not pale.      Findings: No erythema or rash.   Neurological:      Mental Status: She is alert and oriented to person, place, and time.   Psychiatric:         Attention and Perception: She is attentive. She does not perceive auditory or visual hallucinations.         Mood and Affect: Mood normal. Mood is not anxious, depressed or elated. Affect is not labile, blunt, flat, angry, tearful or inappropriate.         Speech: Speech normal. She is communicative. Speech is not rapid and pressured, delayed, slurred or tangential.         Behavior: Behavior normal. Behavior is not agitated, slowed, aggressive, withdrawn, hyperactive or combative. Behavior is cooperative.         Thought Content: Thought content is not paranoid or delusional. Thought content does not include homicidal or suicidal ideation. Thought content does not include homicidal or suicidal plan.              ASSESSMENT/PLAN:     Encounter Diagnoses   Name Primary?    Mixed hyperlipidemia Check blood.    Yes    Nontoxic uninodular goiter Check US and blood.        Mild intermittent asthma without complication (HCC) ? Dx. Stable.        Vaccine counseling Tdap and flu shot. Recc. Covid booster.  Wait on COVID booster 2 weeks after Tdap and flu shot.  Recommend waiting 2 weeks between vaccines.Recommend PCV 20 vaccine.  Can schedule a nurse only visit to get the pneumonia vaccine in 2  weeks after the Tdap and flu shot.         Screening mammogram for breast cancer Check mammogram. Continue self breast exam every month.         Colon cancer screening up-to-date with screening.       Adult general medical examination Check urine.        Pap smear for cervical cancer screening FU gyne. For pap.        Change in nail appearance L big toe. Check Cx. ?        Rash L foot. Try lotrisone 2 times a day with dry skin for 4 weeks.     Insomnia. Stick to a sleep schedule. Keep your bedtime and wake time consistent from day to day, including on weekends.   Stay active. Regular activity helps promote a good night's sleep. Schedule exercise at least a few hours before bedtime and avoid stimulating activities before bedtime.   Check your medications. If you take medications regularly, check with your doctor to see if they may be contributing to your insomnia. Also check the labels of OTC products to see if they contain caffeine or other stimulants, such as pseudoephedrine.   Avoid or limit naps. Naps can make it harder to fall asleep at night. If you can't get by without one, try to limit a nap to no more than 30 minutes and don't nap after 3 p.m.   Avoid or limit caffeine and alcohol and don't use nicotine. All of these can make it harder to sleep, and effects can last for several hours.   Avoid large meals and beverages before bed. A light snack is fine and may help avoid heartburn. Drink less liquid before bedtime so that you won't have to urinate as often.  At bedtime:  Try melatonin 10 mg 30 minutes before bed.  Make your bedroom comfortable for sleep. Only use your bedroom for sex or sleep. Keep it dark and quiet, at a comfortable temperature. Hide all clocks in your bedroom, including your wristwatch and cellphone, so you don't worry about what time it is.   Find ways to relax. Try to put your worries and planning aside when you get into bed. A warm bath or a massage before bedtime can help prepare you  for sleep. Create a relaxing bedtime ritual, such as taking a hot bath, reading, soft music, breathing exercises, yoga or prayer.   Avoid trying too hard to sleep. The harder you try, the more awake you'll become. Read in another room until you become very drowsy, then go to bed to sleep. Don't go to bed too early, before you're sleepy.   Get out of bed when you're not sleeping. Sleep as much as you need to feel rested, and then get out of bed. Don't stay in bed if you're not sleeping.       Take calcium 600 every 12 hrs. With vitamin D 400 IU every  12 hrs. Excerise at least 30 minutes 3-4 times a week. May use calcium citrate as opposed to calcium carbonate which may be better absorbed in the setting of PPI use.  The preferred calcium supplement for people at risk of stone formation is calcium citrate because it helps to increase urinary citrate excretion. We recommend a dose of 200-400 mg if dietary calcium cannot be increased.     Travel.  use insect repellent, wear long-sleeved shirts and long pants, and get vaccinated.    Use insect repellent, wear long-sleeved shirts and pants, treat clothing and gear, and get vaccinated before traveling, if vaccination is recommended for you.  Drink bottled water if possible.  Do not use ice at all.  Use SPF 50 at least and reapply at least every 2 hours.  Use vegetables that have thicker skin that can be peeled off.  I.e. oranges etc.  Try not to drink any juices or anything that has water in it.  If Water are not bilateral to make sure he has been boiled.  Do not add ice to boiled water.     Orders Placed This Encounter   Procedures    Lipid Panel    CBC With Differential With Platelet    Comp Metabolic Panel (14)    TSH W Reflex To Free T4    URINALYSIS, AUTO, W/O SCOPE    Vitamin D    Fluzone trivalent vaccine, PF 0.5mL, 6mo+ (40429)    TETANUS, DIPHTHERIA TOXOIDS AND ACELLULAR PERTUSIS VACCINE (TDAP), >7 YEARS, IM USE    Dermatophyte Only, Culture [E]       Meds This  Visit:  Requested Prescriptions     Signed Prescriptions Disp Refills    clotrimazole-betamethasone 1-0.05 % External Cream 60 g 3     Sig: Apply 1 Application topically 2 (two) times daily.       Imaging & Referrals:  INFLUENZA VACCINE, TRI, PRESERV FREE, 0.5 ML  TETANUS, DIPHTHERIA TOXOIDS AND ACELLULAR PERTUSIS VACCINE (TDAP), >7 YEARS, IM USE  [facility] KARI 2D+3D SCREENING BILAT (CPT=77067/39181)  US THYROID (CPT=76536)      RTC 1 yr, for physical.          [1] No Known Allergies

## 2024-10-31 ENCOUNTER — LAB ENCOUNTER (OUTPATIENT)
Dept: LAB | Age: 49
End: 2024-10-31
Attending: INTERNAL MEDICINE
Payer: COMMERCIAL

## 2024-10-31 DIAGNOSIS — E04.1 NONTOXIC UNINODULAR GOITER: ICD-10-CM

## 2024-10-31 DIAGNOSIS — Z00.00 ADULT GENERAL MEDICAL EXAMINATION: ICD-10-CM

## 2024-10-31 LAB
ALBUMIN SERPL-MCNC: 4.6 G/DL (ref 3.2–4.8)
ALBUMIN/GLOB SERPL: 1.8 {RATIO} (ref 1–2)
ALP LIVER SERPL-CCNC: 62 U/L
ALT SERPL-CCNC: 15 U/L
ANION GAP SERPL CALC-SCNC: 8 MMOL/L (ref 0–18)
AST SERPL-CCNC: 18 U/L (ref ?–34)
BASOPHILS # BLD AUTO: 0.02 X10(3) UL (ref 0–0.2)
BASOPHILS NFR BLD AUTO: 0.3 %
BILIRUB SERPL-MCNC: 0.7 MG/DL (ref 0.3–1.2)
BUN BLD-MCNC: 13 MG/DL (ref 9–23)
BUN/CREAT SERPL: 13.8 (ref 10–20)
CALCIUM BLD-MCNC: 9.9 MG/DL (ref 8.7–10.4)
CHLORIDE SERPL-SCNC: 106 MMOL/L (ref 98–112)
CHOLEST SERPL-MCNC: 200 MG/DL (ref ?–200)
CO2 SERPL-SCNC: 26 MMOL/L (ref 21–32)
CREAT BLD-MCNC: 0.94 MG/DL
DEPRECATED RDW RBC AUTO: 41.5 FL (ref 35.1–46.3)
EGFRCR SERPLBLD CKD-EPI 2021: 75 ML/MIN/1.73M2 (ref 60–?)
EOSINOPHIL # BLD AUTO: 0.17 X10(3) UL (ref 0–0.7)
EOSINOPHIL NFR BLD AUTO: 2.6 %
ERYTHROCYTE [DISTWIDTH] IN BLOOD BY AUTOMATED COUNT: 11.8 % (ref 11–15)
FASTING PATIENT LIPID ANSWER: YES
FASTING STATUS PATIENT QL REPORTED: YES
GLOBULIN PLAS-MCNC: 2.5 G/DL (ref 2–3.5)
GLUCOSE BLD-MCNC: 87 MG/DL (ref 70–99)
HCT VFR BLD AUTO: 43.2 %
HDLC SERPL-MCNC: 64 MG/DL (ref 40–59)
HGB BLD-MCNC: 14.6 G/DL
IMM GRANULOCYTES # BLD AUTO: 0.01 X10(3) UL (ref 0–1)
IMM GRANULOCYTES NFR BLD: 0.2 %
LDLC SERPL CALC-MCNC: 111 MG/DL (ref ?–100)
LYMPHOCYTES # BLD AUTO: 1.51 X10(3) UL (ref 1–4)
LYMPHOCYTES NFR BLD AUTO: 22.8 %
MCH RBC QN AUTO: 32.5 PG (ref 26–34)
MCHC RBC AUTO-ENTMCNC: 33.8 G/DL (ref 31–37)
MCV RBC AUTO: 96.2 FL
MONOCYTES # BLD AUTO: 0.4 X10(3) UL (ref 0.1–1)
MONOCYTES NFR BLD AUTO: 6.1 %
NEUTROPHILS # BLD AUTO: 4.5 X10 (3) UL (ref 1.5–7.7)
NEUTROPHILS # BLD AUTO: 4.5 X10(3) UL (ref 1.5–7.7)
NEUTROPHILS NFR BLD AUTO: 68 %
NONHDLC SERPL-MCNC: 136 MG/DL (ref ?–130)
OSMOLALITY SERPL CALC.SUM OF ELEC: 289 MOSM/KG (ref 275–295)
PLATELET # BLD AUTO: 153 10(3)UL (ref 150–450)
POTASSIUM SERPL-SCNC: 5.2 MMOL/L (ref 3.5–5.1)
PROT SERPL-MCNC: 7.1 G/DL (ref 5.7–8.2)
RBC # BLD AUTO: 4.49 X10(6)UL
SODIUM SERPL-SCNC: 140 MMOL/L (ref 136–145)
TRIGL SERPL-MCNC: 141 MG/DL (ref 30–149)
TSI SER-ACNC: 1.37 MIU/ML (ref 0.55–4.78)
VIT D+METAB SERPL-MCNC: 40.9 NG/ML (ref 30–100)
VLDLC SERPL CALC-MCNC: 24 MG/DL (ref 0–30)
WBC # BLD AUTO: 6.6 X10(3) UL (ref 4–11)

## 2024-10-31 PROCEDURE — 85025 COMPLETE CBC W/AUTO DIFF WBC: CPT | Performed by: INTERNAL MEDICINE

## 2024-10-31 PROCEDURE — 82306 VITAMIN D 25 HYDROXY: CPT

## 2024-10-31 PROCEDURE — 80061 LIPID PANEL: CPT | Performed by: INTERNAL MEDICINE

## 2024-10-31 PROCEDURE — 36415 COLL VENOUS BLD VENIPUNCTURE: CPT | Performed by: INTERNAL MEDICINE

## 2024-10-31 PROCEDURE — 80053 COMPREHEN METABOLIC PANEL: CPT | Performed by: INTERNAL MEDICINE

## 2024-10-31 PROCEDURE — 84443 ASSAY THYROID STIM HORMONE: CPT | Performed by: INTERNAL MEDICINE

## 2024-10-31 PROCEDURE — 87086 URINE CULTURE/COLONY COUNT: CPT | Performed by: INTERNAL MEDICINE

## 2024-11-07 ENCOUNTER — LAB ENCOUNTER (OUTPATIENT)
Dept: LAB | Age: 49
End: 2024-11-07
Attending: INTERNAL MEDICINE
Payer: COMMERCIAL

## 2024-11-07 DIAGNOSIS — E87.5 HYPERKALEMIA: ICD-10-CM

## 2024-11-07 LAB
ANION GAP SERPL CALC-SCNC: 5 MMOL/L (ref 0–18)
BUN BLD-MCNC: 13 MG/DL (ref 9–23)
BUN/CREAT SERPL: 14.3 (ref 10–20)
CALCIUM BLD-MCNC: 9.9 MG/DL (ref 8.7–10.4)
CHLORIDE SERPL-SCNC: 108 MMOL/L (ref 98–112)
CO2 SERPL-SCNC: 29 MMOL/L (ref 21–32)
CREAT BLD-MCNC: 0.91 MG/DL
EGFRCR SERPLBLD CKD-EPI 2021: 78 ML/MIN/1.73M2 (ref 60–?)
FASTING STATUS PATIENT QL REPORTED: YES
GLUCOSE BLD-MCNC: 85 MG/DL (ref 70–99)
OSMOLALITY SERPL CALC.SUM OF ELEC: 293 MOSM/KG (ref 275–295)
POTASSIUM SERPL-SCNC: 4.7 MMOL/L (ref 3.5–5.1)
SODIUM SERPL-SCNC: 142 MMOL/L (ref 136–145)

## 2024-11-07 PROCEDURE — 80048 BASIC METABOLIC PNL TOTAL CA: CPT

## 2024-11-07 PROCEDURE — 36415 COLL VENOUS BLD VENIPUNCTURE: CPT

## 2024-12-23 ENCOUNTER — LAB ENCOUNTER (OUTPATIENT)
Dept: LAB | Age: 49
End: 2024-12-23
Attending: INTERNAL MEDICINE
Payer: COMMERCIAL

## 2024-12-23 DIAGNOSIS — L60.8 CHANGE IN NAIL APPEARANCE: ICD-10-CM

## 2024-12-23 PROCEDURE — 87101 SKIN FUNGI CULTURE: CPT

## 2024-12-26 ENCOUNTER — TELEPHONE (OUTPATIENT)
Dept: INTERNAL MEDICINE CLINIC | Facility: CLINIC | Age: 49
End: 2024-12-26

## 2024-12-26 ENCOUNTER — HOSPITAL ENCOUNTER (OUTPATIENT)
Dept: GENERAL RADIOLOGY | Age: 49
Discharge: HOME | End: 2024-12-26
Attending: INTERNAL MEDICINE
Payer: COMMERCIAL

## 2024-12-26 ENCOUNTER — TELEMEDICINE (OUTPATIENT)
Dept: INTERNAL MEDICINE CLINIC | Facility: CLINIC | Age: 49
End: 2024-12-26
Payer: COMMERCIAL

## 2024-12-26 DIAGNOSIS — E78.2 MIXED HYPERLIPIDEMIA: Primary | ICD-10-CM

## 2024-12-26 DIAGNOSIS — R05.1 ACUTE COUGH: ICD-10-CM

## 2024-12-26 DIAGNOSIS — Z71.85 VACCINE COUNSELING: ICD-10-CM

## 2024-12-26 DIAGNOSIS — E04.1 NONTOXIC UNINODULAR GOITER: ICD-10-CM

## 2024-12-26 PROCEDURE — 99214 OFFICE O/P EST MOD 30 MIN: CPT | Performed by: INTERNAL MEDICINE

## 2024-12-26 PROCEDURE — 71046 X-RAY EXAM CHEST 2 VIEWS: CPT | Performed by: INTERNAL MEDICINE

## 2024-12-26 RX ORDER — MONTELUKAST SODIUM 10 MG/1
10 TABLET ORAL NIGHTLY
Qty: 5 TABLET | Refills: 0 | Status: SHIPPED | OUTPATIENT
Start: 2024-12-26 | End: 2024-12-31

## 2024-12-26 RX ORDER — ALBUTEROL SULFATE 90 UG/1
2 INHALANT RESPIRATORY (INHALATION) EVERY 4 HOURS PRN
Qty: 8.5 G | Refills: 0 | Status: SHIPPED | OUTPATIENT
Start: 2024-12-26

## 2024-12-26 NOTE — PROGRESS NOTES
Virtual Telephone Check-In    Lo Amato verbally consents to a Virtual [facility] Telephone Check-In visit on 24.    Patient understands and accepts financial responsibility for any deductible, co-insurance and/or co-pays associated with this service.    Dr. Quinn's location is at [facility].   The patient's location is at home in [place] and their identity has been established.   The patient understands that we are limiting office visits due to the COVID-19 pandemic and was informed that consent to treat includes permission to submit charges to insurance. It was also shared that without being seen and evaluated in person, there is a risk that the information and/or assessment may be incomplete or inaccurate.    Duration of the service: 11  : 00 AM -   11 : 23  PM.     Past Medical History:    Ankle fracture    Pregnancy (HCC)    -, FT-F--5#13; 7347-MG-E--5#8    Thyroid nodule    biopsy benign     Allergies[1]    Current Outpatient Medications:     nirmatrelvir-ritonavir 300-100 MG Oral Tablet Therapy Pack, Take two nirmatrelvir tablets (300mg) with one ritonavir tablet (100mg) together twice daily for 5 days., Disp: 30 tablet, Rfl: 0    albuterol (PROAIR HFA) 108 (90 Base) MCG/ACT Inhalation Aero Soln, Inhale 2 puffs into the lungs every 4 (four) hours as needed for Wheezing., Disp: 8.5 g, Rfl: 0    montelukast (SINGULAIR) 10 MG Oral Tab, Take 1 tablet (10 mg total) by mouth nightly for 5 days., Disp: 5 tablet, Rfl: 0    clotrimazole-betamethasone 1-0.05 % External Cream, Apply 1 Application topically 2 (two) times daily., Disp: 60 g, Rfl: 3    PEG 3350-KCl-Na Bicarb-NaCl (TRILYTE) 420 g Oral Recon Soln, Take prep as directed by gastro office. May substitute with Trilyte/generic equivalent if needed., Disp: 4000 mL, Rfl: 0    Summary of topics discussed: I, Dr. Quinn, spoke with pt.   Tested + for covid yesterday. Monday feels body aches and chest pressure. No,N,V. No abd. pain. Nor D.  Fatigue. Mild cough. No ST. No wheezing. Lives with     Review of Systems:    General: Denies chills/fever, night sweats, weight loss  Neurological: Denies frequent headaches  Cardiovascular: Denies leg swelling, chest pain or pressure after eating or when upset, irregular heart rate/palpitations, dizziness, N,V, exertional arm pain nor neck pain  Patient is alert and oriented x3.  Able to speak in full sentence without short of breath. Alert and oriented on phone, no SOB or wheezing, not anxious  & MS sharp.    Diagnoses and all orders for this visit:    Mixed hyperlipidemia    Nontoxic uninodular goiter    Acute cough  -     XR CHEST PA + LAT CHEST (CPT=71046); Future    Vaccine counseling    Other orders  -     nirmatrelvir-ritonavir 300-100 MG Oral Tablet Therapy Pack; Take two nirmatrelvir tablets (300mg) with one ritonavir tablet (100mg) together twice daily for 5 days.  -     albuterol (PROAIR HFA) 108 (90 Base) MCG/ACT Inhalation Aero Soln; Inhale 2 puffs into the lungs every 4 (four) hours as needed for Wheezing.  -     montelukast (SINGULAIR) 10 MG Oral Tab; Take 1 tablet (10 mg total) by mouth nightly for 5 days.        Pt. aware to schedule follow up with OV and doing this telephone visit now due to current covid situation.     Please note that the following visit was completed using a 2 way real-time interactive audio and video communication.  This has been done in good casey to provide continuity of care in the best interest of the patient provider relationship, due to ongoing public health crisis, National emergency and because of restrictions of visitation.  There are limitations visit as no physical exam could be performed.  Every conscious effort was taken to allow for sufficient and adequate time.  This billing was spent on reviewing labs, medications, radiological test and decision making.  Appropriate medical decision making and tests are ordered as detailed in the plan of care above.    Rose Marie  BRUCE Quinn MD      HPI:    Patient ID: Lo Amato is a 49 year old female.    Exercise level: not active and has been following low salt diet.  Weight has been stable.    Wt Readings from Last 3 Encounters:   10/24/24 147 lb 6.4 oz (66.9 kg)   08/05/22 132 lb 9.6 oz (60.1 kg)   04/20/21 132 lb 3.2 oz (60 kg)     BP Readings from Last 3 Encounters:   10/24/24 104/69   08/05/22 102/69   04/20/21 100/66     Labs:   Lab Results   Component Value Date/Time    GLU 85 11/07/2024 08:37 AM     11/07/2024 08:37 AM    K 4.7 11/07/2024 08:37 AM     11/07/2024 08:37 AM    CO2 29.0 11/07/2024 08:37 AM    CREATSERUM 0.91 11/07/2024 08:37 AM    CA 9.9 11/07/2024 08:37 AM    AST 18 10/31/2024 08:36 AM    ALT 15 10/31/2024 08:36 AM    TSH 1.368 10/31/2024 08:36 AM    T4F 0.87 08/24/2012 01:00 PM        Lab Results   Component Value Date/Time    CHOLEST 200 (H) 10/31/2024 08:36 AM    HDL 64 (H) 10/31/2024 08:36 AM    TRIG 141 10/31/2024 08:36 AM     (H) 10/31/2024 08:36 AM    NONHDLC 136 (H) 10/31/2024 08:36 AM          HPI      Review of Systems   Constitutional:  Positive for activity change, appetite change and fatigue. Negative for chills, diaphoresis, fever and unexpected weight change.   HENT:  Positive for congestion. Negative for dental problem, drooling, ear discharge, ear pain, facial swelling, hearing loss, mouth sores, nosebleeds, postnasal drip, rhinorrhea, sinus pressure, sinus pain, sneezing, sore throat, tinnitus, trouble swallowing and voice change.    Eyes:  Negative for photophobia, pain, discharge, redness, itching and visual disturbance.   Respiratory:  Positive for cough and chest tightness. Negative for apnea, choking, shortness of breath, wheezing and stridor.    Cardiovascular:  Negative for chest pain, palpitations and leg swelling.   Gastrointestinal:  Negative for abdominal distention and abdominal pain.   Endocrine: Negative for cold intolerance, heat intolerance, polydipsia,  polyphagia and polyuria.   Genitourinary:  Negative for dysuria.   Skin:  Negative for rash.   Allergic/Immunologic: Negative for environmental allergies.   Neurological:  Negative for dizziness, tremors, seizures, syncope, facial asymmetry, speech difficulty, weakness, light-headedness, numbness and headaches.   All other systems reviewed and are negative.        Current Outpatient Medications   Medication Sig Dispense Refill    nirmatrelvir-ritonavir 300-100 MG Oral Tablet Therapy Pack Take two nirmatrelvir tablets (300mg) with one ritonavir tablet (100mg) together twice daily for 5 days. 30 tablet 0    albuterol (PROAIR HFA) 108 (90 Base) MCG/ACT Inhalation Aero Soln Inhale 2 puffs into the lungs every 4 (four) hours as needed for Wheezing. 8.5 g 0    montelukast (SINGULAIR) 10 MG Oral Tab Take 1 tablet (10 mg total) by mouth nightly for 5 days. 5 tablet 0    clotrimazole-betamethasone 1-0.05 % External Cream Apply 1 Application topically 2 (two) times daily. 60 g 3    PEG 3350-KCl-Na Bicarb-NaCl (TRILYTE) 420 g Oral Recon Soln Take prep as directed by gastro office. May substitute with Trilyte/generic equivalent if needed. 4000 mL 0     Allergies:Allergies[2]    HISTORY:  Past Medical History:    Ankle fracture    Pregnancy (HCC)    -, FT-F--5#13; 8429-HN-S--5#8    Thyroid nodule    biopsy benign      History reviewed. No pertinent surgical history.   Family History   Problem Relation Age of Onset    Heart Disease Father         CAD    Diabetes Father     Cancer Maternal Grandfather         lung    Diabetes Mother     Heart Disease Mother         CAD    Colon Cancer Paternal Grandfather       Social History:   Social History     Socioeconomic History    Marital status:    Tobacco Use    Smoking status: Never    Smokeless tobacco: Never   Vaping Use    Vaping status: Never Used   Substance and Sexual Activity    Alcohol use: Yes     Alcohol/week: 0.0 standard drinks of alcohol     Comment:  occasionally    Drug use: Never    Sexual activity: Yes     Comment: NONE   Other Topics Concern    Caffeine Concern Yes     Comment: coffee, chocolate, 1 cups daily        PHYSICAL EXAM:   There were no vitals taken for this visit.  BP Readings from Last 3 Encounters:   10/24/24 104/69   08/05/22 102/69   04/20/21 100/66     Wt Readings from Last 3 Encounters:   10/24/24 147 lb 6.4 oz (66.9 kg)   08/05/22 132 lb 9.6 oz (60.1 kg)   04/20/21 132 lb 3.2 oz (60 kg)       Physical Exam         ASSESSMENT/PLAN:     Encounter Diagnoses   Name Primary?    Mixed hyperlipidemia check blood January 31, 2025.   Yes    Nontoxic uninodular goiter check ultrasound.  Stable with blood work.       Acute cough COVID-positive.  Discussed about isolation methods.  Can do alternating Tylenol Motrin if need be.  Hydrate at least 48 ounces of water a day but no cold liquids.  Avoid dairy.  Can do Mucinex over-the-counter.Most colds are viral, Can try Mucinex or Robitussin if very thick secretions, if watery, can try dayquil or nyquil which will help the drainage.  Steam also helps and plenty of rest.  Rx for Paxlovid and albuterol sent to the pharmacy.  Discussed about side effects and use. Use albuterol every 3 hrs. For 2 days and as needed and then, as needed. Check CXR.  Therapeutic proning discussed.       Vaccine counseling can do COVID booster 2 weeks after feeling better either at the local pharmacy or through the drive-through [facility].  Given information.  Otherwise up-to-date with vaccines.        No orders of the defined types were placed in this encounter.      Meds This Visit:  Requested Prescriptions     Signed Prescriptions Disp Refills    nirmatrelvir-ritonavir 300-100 MG Oral Tablet Therapy Pack 30 tablet 0     Sig: Take two nirmatrelvir tablets (300mg) with one ritonavir tablet (100mg) together twice daily for 5 days.    albuterol (PROAIR HFA) 108 (90 Base) MCG/ACT Inhalation Aero Soln 8.5 g 0     Sig: Inhale 2  puffs into the lungs every 4 (four) hours as needed for Wheezing.    montelukast (SINGULAIR) 10 MG Oral Tab 5 tablet 0     Sig: Take 1 tablet (10 mg total) by mouth nightly for 5 days.       Imaging & Referrals:  XR CHEST PA + LAT CHEST (ZWR=[id number])        Return to clinic as needed.         [1] No Known Allergies  [2] No Known Allergies

## 2024-12-26 NOTE — TELEPHONE ENCOUNTER
Pt stated she tested positive for COVID  yesterday, symptoms started Monday -body ache, sore throat , head ache,nasal congestion   Taking nyquil  Advised CDC guidelines, tret s/s, hydration,  Video appt made, advised almost at end of window for Paxlovid    Was the patient seen in the last year in this department? Yes    Does patient have an active prescription for medications requested? Yes    Received Request Via: Pharmacy     Requested Prescriptions     Pending Prescriptions Disp Refills   • levothyroxine (SYNTHROID) 75 MCG Tab 30 Tab 1     Sig: Take 1 Tab by mouth Every morning on an empty stomach.

## 2024-12-26 NOTE — PATIENT INSTRUCTIONS
ASSESSMENT/PLAN:     Encounter Diagnoses   Name Primary?    Mixed hyperlipidemia check blood January 31, 2025.   Yes    Nontoxic uninodular goiter check ultrasound.  Stable blood work.       Acute cough COVID-positive.  Discussed about isolation methods.  Can do alternating Tylenol Motrin if need be.  Hydrate at least 48 ounces of water a day but no cold liquids.  Avoid dairy.  Can do Mucinex over-the-counter.Most colds are viral, Can try Mucinex or Robitussin if very thick secretions, if watery, can try dayquil or nyquil which will help the drainage.  Steam also helps and plenty of rest.  Rx for Paxlovid and albuterol sent to the pharmacy.  Discussed about side effects and use. Use albuterol every 3 hrs. For 2 days and as needed and then, as needed. Check CXR.  Therapeutic proning discussed. The patient/caregiver has been given the “Fact Sheet for Patients, Parents and Caregivers”, informed of alternatives to receiving authorized Regen-COV, and informed that casirivimab and imdevimab are unapproved drugs that are authorized for use under this Emergency Use Authorization. The patient has agreed and will receive the infusion and be monitored for 60 minutes after.   They have been advised that they should continue to self-isolate and use infection control measures (e.g., wear mask, isolate, social distance, avoid sharing personal items, clean and disinfect “high touch” surfaces, and frequent handwashing) according to CDC guidelines.    Patient directed to isolate away form any household members as much as possible and the general public COMPLETELY for 3 days after symptoms resolve, or 10 days total (whichever is longer). If patient can use an isolated bathroom that would be ideal. Instructed that if patient develops chest pain, altered mental status/confusion, or if unable to speak full sentence due to shortness of breath, should to come to emergency department. If able, please call ahead to let the hospital know you  are on your way. Unless contraindicated due to chronic illness, may take Tylenol 650 mg every 6 hours as needed for pain/temperature. Do not take any OTC anti-inflammatories (ibuprofen, naproxen, aspirin, etc). Please get plenty of rest and increase your intake of oral fluids. If patient has any further questions,  please contact [facility] COVID hotline at [phone]. Patient verbalized understanding.    Additionally, We are looking for patients who have recovered from COVID-19 who would be interested in donating plasma. Plasma is a component of blood that, in people that have recovered from COVID-19, contains antibodies against the virus. The antibodies in the plasma can be used as a treatment for patients in our community who are most severely affected by the virus. The process for donating plasma is very similar to donating blood. Is this something you would be interested in? Here is the website for more information: https://[email or web address]/home/[email or web address]-plasma-[email or web address]        Vaccine counseling can do COVID booster 2 weeks after feeling better either at the local pharmacy or through the drive-through [facility].  Given information.  Otherwise up-to-date with vaccines.        No orders of the defined types were placed in this encounter.      Meds This Visit:  Requested Prescriptions     Signed Prescriptions Disp Refills    nirmatrelvir-ritonavir 300-100 MG Oral Tablet Therapy Pack 30 tablet 0     Sig: Take two nirmatrelvir tablets (300mg) with one ritonavir tablet (100mg) together twice daily for 5 days.    albuterol (PROAIR HFA) 108 (90 Base) MCG/ACT Inhalation Aero Soln 8.5 g 0     Sig: Inhale 2 puffs into the lungs every 4 (four) hours as needed for Wheezing.    montelukast (SINGULAIR) 10 MG Oral Tab 5 tablet 0     Sig: Take 1 tablet (10 mg total) by mouth nightly for 5 days.       Imaging & Referrals:  XR CHEST PA + LAT CHEST (ZWX=[id number])        Return to clinic as needed.

## 2025-01-06 ENCOUNTER — HOSPITAL ENCOUNTER (OUTPATIENT)
Dept: ULTRASOUND IMAGING | Age: 50
Discharge: HOME | End: 2025-01-06
Attending: INTERNAL MEDICINE
Payer: COMMERCIAL

## 2025-01-06 DIAGNOSIS — E04.1 NONTOXIC UNINODULAR GOITER: ICD-10-CM

## 2025-01-06 PROCEDURE — 76536 US EXAM OF HEAD AND NECK: CPT | Performed by: INTERNAL MEDICINE

## 2025-01-31 ENCOUNTER — HOSPITAL ENCOUNTER (OUTPATIENT)
Dept: MAMMOGRAPHY | Age: 50
Discharge: HOME | End: 2025-01-31
Attending: INTERNAL MEDICINE
Payer: COMMERCIAL

## 2025-01-31 DIAGNOSIS — Z12.31 SCREENING MAMMOGRAM FOR BREAST CANCER: ICD-10-CM

## 2025-01-31 PROCEDURE — 77067 SCR MAMMO BI INCL CAD: CPT | Performed by: INTERNAL MEDICINE

## 2025-01-31 PROCEDURE — 77063 BREAST TOMOSYNTHESIS BI: CPT | Performed by: INTERNAL MEDICINE

## 2025-02-04 RX ORDER — CLOTRIMAZOLE AND BETAMETHASONE DIPROPIONATE 10; .64 MG/G; MG/G
1 CREAM TOPICAL 2 TIMES DAILY
Qty: 60 G | Refills: 3 | OUTPATIENT
Start: 2025-02-04

## 2025-02-04 NOTE — TELEPHONE ENCOUNTER
Clotrimazole-betamethasone cream: 60 grams with 3 refills sent to [facility] in [place] on 10/24/2024-dispense history shows only 1 tube has been filled

## 2025-02-27 ENCOUNTER — LAB ENCOUNTER (OUTPATIENT)
Dept: LAB | Age: 50
End: 2025-02-27
Attending: INTERNAL MEDICINE
Payer: COMMERCIAL

## 2025-02-27 DIAGNOSIS — E78.2 MIXED HYPERLIPIDEMIA: ICD-10-CM

## 2025-02-27 LAB
CHOLEST SERPL-MCNC: 188 MG/DL (ref ?–200)
FASTING PATIENT LIPID ANSWER: YES
HDLC SERPL-MCNC: 70 MG/DL (ref 40–59)
LDLC SERPL CALC-MCNC: 99 MG/DL (ref ?–100)
NONHDLC SERPL-MCNC: 118 MG/DL (ref ?–130)
TRIGL SERPL-MCNC: 105 MG/DL (ref 30–149)
VLDLC SERPL CALC-MCNC: 17 MG/DL (ref 0–30)

## 2025-02-27 PROCEDURE — 36415 COLL VENOUS BLD VENIPUNCTURE: CPT

## 2025-02-27 PROCEDURE — 80061 LIPID PANEL: CPT

## 2025-04-18 ENCOUNTER — LAB ENCOUNTER (OUTPATIENT)
Dept: LAB | Age: 50
End: 2025-04-18
Attending: PHYSICIAN ASSISTANT
Payer: COMMERCIAL

## 2025-04-18 ENCOUNTER — OFFICE VISIT (OUTPATIENT)
Dept: DERMATOLOGY CLINIC | Facility: CLINIC | Age: 50
End: 2025-04-18

## 2025-04-18 DIAGNOSIS — B35.1 ONYCHOMYCOSIS: Primary | ICD-10-CM

## 2025-04-18 DIAGNOSIS — L82.1 SEBORRHEIC KERATOSIS: ICD-10-CM

## 2025-04-18 LAB
ALT SERPL-CCNC: 13 U/L (ref 10–49)
AST SERPL-CCNC: 17 U/L (ref ?–34)

## 2025-04-18 PROCEDURE — 99203 OFFICE O/P NEW LOW 30 MIN: CPT | Performed by: PHYSICIAN ASSISTANT

## 2025-04-18 PROCEDURE — 84460 ALANINE AMINO (ALT) (SGPT): CPT | Performed by: PHYSICIAN ASSISTANT

## 2025-04-18 PROCEDURE — 36415 COLL VENOUS BLD VENIPUNCTURE: CPT | Performed by: PHYSICIAN ASSISTANT

## 2025-04-18 PROCEDURE — 84450 TRANSFERASE (AST) (SGOT): CPT | Performed by: PHYSICIAN ASSISTANT

## 2025-04-18 RX ORDER — TERBINAFINE HYDROCHLORIDE 250 MG/1
250 TABLET ORAL DAILY
Qty: 45 TABLET | Refills: 0 | Status: SHIPPED | OUTPATIENT
Start: 2025-04-18

## 2025-04-18 NOTE — PROGRESS NOTES
HPI:    Patient ID: Lo Amato is a 50 year old female.    Patient presents for chronic cracked skin and fungus on great toe. Has tried clotrimazole-betamethasone with minor improvement. Has tried many topicals with no relief. Has a flat scaly large freckle on her right thigh. Denies dryness or itching. Denies personal or family hx of skin cancer. No draining or tenderness noted. No alelrgies to medications noted.         Review of Systems   Constitutional:  Negative for chills and fever.   Musculoskeletal:  Negative for arthralgias and myalgias.   Skin:  Positive for rash. Negative for color change and wound.          Current Medications[1]  Allergies:Allergies[2]   LMP 01/31/2025   There is no height or weight on file to calculate BMI.  PHYSICAL EXAM:   Physical Exam  Constitutional:       General: She is not in acute distress.     Appearance: Normal appearance.   Skin:     General: Skin is warm and dry.      Findings: Rash present.      Comments: Scaling and yellowing of the nail noted on the left foot large toe. No draining or tenderness noted. No erythema noted.     SK noted on the right upper thigh. No draining or tenderness noted. No scaling noted. Tan and pink macular lesion with stuck on appearance noted.    Neurological:      Mental Status: She is alert and oriented to person, place, and time.                ASSESSMENT/PLAN:   1. Onychomycosis  -After discussion with patient, advised the following:  -Check AST and ALT  -If normal will prescribe lamasil  -Educated to take 1 pill daily for 6 weeks  -Then return in 6 weeks for follow-up and labs  -To call or follow-up with worsening symptoms or concerns  -Patient was agreeable to plan and will comply with discussion above.     - AST (SGOT)  - ALT(SGPT)    2. Seborrheic keratosis  -After discussion with patient, advised the following:  -Benign lesion  -Watch for now  -Return if there are changes.   -To call or follow-up with worsening symptoms or  concerns  -Patient was agreeable to plan and will comply with discussion above.       Orders Placed This Encounter   Procedures    AST (SGOT)    ALT(SGPT)       Meds This Visit:  Requested Prescriptions      No prescriptions requested or ordered in this encounter       Imaging & Referrals:  None         ID#[id number]       [1]   Current Outpatient Medications   Medication Sig Dispense Refill    albuterol (PROAIR HFA) 108 (90 Base) MCG/ACT Inhalation Aero Soln Inhale 2 puffs into the lungs every 4 (four) hours as needed for Wheezing. 8.5 g 0    clotrimazole-betamethasone 1-0.05 % External Cream Apply 1 Application topically 2 (two) times daily. 60 g 3    PEG 3350-KCl-Na Bicarb-NaCl (TRILYTE) 420 g Oral Recon Soln Take prep as directed by gastro office. May substitute with Trilyte/generic equivalent if needed. (Patient not taking: Reported on 4/18/2025) 4000 mL 0   [2] No Known Allergies

## 2025-04-21 RX ORDER — TERBINAFINE HYDROCHLORIDE 250 MG/1
250 TABLET ORAL DAILY
Qty: 45 TABLET | Refills: 0 | Status: SHIPPED | OUTPATIENT
Start: 2025-04-21

## 2025-06-03 ENCOUNTER — OFFICE VISIT (OUTPATIENT)
Dept: DERMATOLOGY CLINIC | Facility: CLINIC | Age: 50
End: 2025-06-03

## 2025-06-03 ENCOUNTER — LAB ENCOUNTER (OUTPATIENT)
Dept: LAB | Age: 50
End: 2025-06-03
Attending: PHYSICIAN ASSISTANT
Payer: COMMERCIAL

## 2025-06-03 DIAGNOSIS — B35.1 ONYCHOMYCOSIS: Primary | ICD-10-CM

## 2025-06-03 LAB
ALT SERPL-CCNC: 14 U/L (ref 10–49)
AST SERPL-CCNC: 17 U/L (ref ?–34)

## 2025-06-03 PROCEDURE — 84460 ALANINE AMINO (ALT) (SGPT): CPT | Performed by: PHYSICIAN ASSISTANT

## 2025-06-03 PROCEDURE — 84450 TRANSFERASE (AST) (SGOT): CPT | Performed by: PHYSICIAN ASSISTANT

## 2025-06-03 PROCEDURE — 99213 OFFICE O/P EST LOW 20 MIN: CPT | Performed by: PHYSICIAN ASSISTANT

## 2025-06-03 PROCEDURE — 36415 COLL VENOUS BLD VENIPUNCTURE: CPT | Performed by: PHYSICIAN ASSISTANT

## 2025-06-03 NOTE — PROGRESS NOTES
HPI:    Patient ID: Lo Amato is a 50 year old female.    Patient presents for follow-up on toe nail fungus on the left foot. No draining or tenderness noted. No scaling noted. No itching noted. No pain. No allergies to medications noted. Has seen improvement in the nail.         Review of Systems   Constitutional:  Negative for chills and fever.   Musculoskeletal:  Negative for arthralgias and myalgias.   Skin:  Positive for rash. Negative for color change and wound.          Current Medications[1]  Allergies:Allergies[2]   [facility] 01/31/2025   There is no height or weight on file to calculate BMI.  PHYSICAL EXAM:   Physical Exam  Constitutional:       General: She is not in acute distress.     Appearance: Normal appearance.   Skin:     General: Skin is warm and dry.      Findings: Rash present.      Comments: Yellowing is lessening. No draining or tendenress noted. No scaling. No erythema noted.    Neurological:      Mental Status: She is alert and oriented to person, place, and time.                ASSESSMENT/PLAN:   1. Onychomycosis  -After discussion with patient, advised the following:  -Ordered AST and ALT  -Once returned and normal will decide on giving refill for lamasil  -Will have to take lamasil for another 6 weeks.   -Letter generated to take with to [place].   -To call or follow-up with worsening symptoms or concerns  -Patient was agreeable to plan and will comply with discussion above.     - AST (SGOT)  - ALT(SGPT)      Orders Placed This Encounter   Procedures    AST (SGOT)    ALT(SGPT)       Meds This Visit:  Requested Prescriptions      No prescriptions requested or ordered in this encounter       Imaging & Referrals:  None         ID#[id number]       [1]   Current Outpatient Medications   Medication Sig Dispense Refill    terbinafine 250 MG Oral Tab Take 1 tablet (250 mg total) by mouth daily. 45 tablet 0    terbinafine 250 MG Oral Tab Take 1 tablet (250 mg total) by mouth daily. 45 tablet 0     albuterol (PROAIR HFA) 108 (90 Base) MCG/ACT Inhalation Aero Soln Inhale 2 puffs into the lungs every 4 (four) hours as needed for Wheezing. 8.5 g 0    clotrimazole-betamethasone 1-0.05 % External Cream Apply 1 Application topically 2 (two) times daily. 60 g 3    PEG 3350-KCl-Na Bicarb-NaCl (TRILYTE) 420 g Oral Recon Soln Take prep as directed by gastro office. May substitute with Trilyte/generic equivalent if needed. (Patient not taking: Reported on 6/3/2025) 4000 mL 0   [2] No Known Allergies

## 2025-08-15 ENCOUNTER — OFFICE VISIT (OUTPATIENT)
Dept: DERMATOLOGY CLINIC | Facility: CLINIC | Age: 50
End: 2025-08-15

## 2025-08-15 DIAGNOSIS — B35.1 ONYCHOMYCOSIS: Primary | ICD-10-CM

## 2025-08-15 DIAGNOSIS — D23.9 BENIGN NEOPLASM OF SKIN, UNSPECIFIED LOCATION: ICD-10-CM

## 2025-08-15 DIAGNOSIS — L82.1 SEBORRHEIC KERATOSIS: ICD-10-CM

## 2025-08-15 RX ORDER — TERBINAFINE HYDROCHLORIDE 250 MG/1
250 TABLET ORAL DAILY
Qty: 30 TABLET | Refills: 1 | Status: SHIPPED | OUTPATIENT
Start: 2025-08-15

## (undated) DIAGNOSIS — R39.15 URGENCY OF URINATION: ICD-10-CM

## (undated) DIAGNOSIS — R30.9 PAINFUL URINATION: Primary | ICD-10-CM

## (undated) DIAGNOSIS — R35.0 FREQUENT URINATION: Primary | ICD-10-CM

## (undated) DIAGNOSIS — R82.90 CLOUDY URINE: ICD-10-CM

## (undated) NOTE — LETTER
11/8/2019              [address] [place] [address]        [address] [facility] [address] [address]         Dear Caleb Pedraza,      It was a pleasure to see you at our [address] [place] [address] office.   Your Normal mammogram. Breast ti

## (undated) NOTE — MR AVS SNAPSHOT
[address] [facility] [id number] [phone]               Thank you for choosing us for your health care visit with Klaus Wise.  Frank Narayanan MD.  We are glad to serve you and happy to provide you with this summary of your v Thyroid nodule l side. Check U/S and blood. Knee pain and stiffness. Excercises. Stretch pre and post. Hydrate. Comfortable and support shoes. Hold imaging.        Orders Placed This Encounter  Lipid Panel [E]  Comp Metabolic Panel (14) [E]  TSH W Refle

## (undated) NOTE — LETTER
6/3/2025              Lo Amato        [address] [facility] [address]             To whom it may concern,    Please allow Lo Amato to bring her medication terbinafine 250 MG Oral Tab on their travels.  This medication is to treat toe nail fungus.  She may take this medication without limitations or restrictions.  For additional questions or concerns please contact the office at [phone].    Sincerely,    Josephine Weinstein PA-C

## (undated) NOTE — LETTER
11/15/2018              [address] [place] [address] [address] [address]         Dear Betti Homans,    It was a pleasure to see you. Your mammogram was normal.  There is no need for further testing at this time.   I look forward to seeing you

## (undated) NOTE — MR AVS SNAPSHOT
[address] [facility] [address]  [phone]               Thank you for choosing us for your health care visit with [facility].   We are glad to serve you and happy to provide you with this summary of your vi

## (undated) NOTE — MR AVS SNAPSHOT
[place]  [address]  [phone]               Thank you for choosing us for your health care visit with Mariah Carey MD.  We are glad to serve you and happy to provide you with this summary of Support Staff. Remember, [facility] is NOT to be used for urgent needs. For medical emergencies, dial 911. Visit [email or web address] to learn more.            Visit [facility] online at  Located within [email or web address]

## (undated) NOTE — Clinical Note
2/17/2017              [address] [place] [address]        [address] [facility] [address] [address]         Dear Quinton Mora,    It was a pleasure to see you at our [address] [facility] office. Your PAP and HPV were negative.  I look forward to

## (undated) NOTE — LETTER
3/9/2017              [address] [place] [address]         Dear Mike Salmon,    It was a pleasure to see you. Your mammogram was normal. Repeat in 1 year. Encouraged to do monthly self breast exams.  I look forward to seei

## (undated) NOTE — LETTER
AUTHORIZATION FOR SURGICAL OPERATION OR OTHER PROCEDURE    1. I hereby authorize Dr. Delle Aschoff, and [facility] staff assigned to my case to perform the following operation and/or procedure at the [facility]:    IUD REMOVAL    2.   My physician has Relationship to Patient:           []  Parent    Responsible person                          []  Spouse  In case of minor or                    [] Other  _____________   Incompetent name:  __________________________________________________